# Patient Record
Sex: FEMALE | Race: WHITE | URBAN - METROPOLITAN AREA
[De-identification: names, ages, dates, MRNs, and addresses within clinical notes are randomized per-mention and may not be internally consistent; named-entity substitution may affect disease eponyms.]

---

## 2021-01-01 ENCOUNTER — EMERGENCY (EMERGENCY)
Facility: HOSPITAL | Age: 81
LOS: 1 days | End: 2021-01-01
Admitting: EMERGENCY MEDICINE
Payer: MEDICARE

## 2021-01-01 ENCOUNTER — INPATIENT (INPATIENT)
Facility: HOSPITAL | Age: 81
LOS: 3 days | DRG: 23 | End: 2021-04-02
Attending: PSYCHIATRY & NEUROLOGY | Admitting: PSYCHIATRY & NEUROLOGY
Payer: MEDICARE

## 2021-01-01 VITALS
HEART RATE: 50 BPM | DIASTOLIC BLOOD PRESSURE: 82 MMHG | SYSTOLIC BLOOD PRESSURE: 145 MMHG | OXYGEN SATURATION: 100 % | RESPIRATION RATE: 14 BRPM

## 2021-01-01 VITALS — OXYGEN SATURATION: 95 % | HEART RATE: 96 BPM | RESPIRATION RATE: 21 BRPM

## 2021-01-01 DIAGNOSIS — I63.9 CEREBRAL INFARCTION, UNSPECIFIED: ICD-10-CM

## 2021-01-01 LAB
A1C WITH ESTIMATED AVERAGE GLUCOSE RESULT: 5.3 % — SIGNIFICANT CHANGE UP (ref 4–5.6)
ABO RH CONFIRMATION: SIGNIFICANT CHANGE UP
ANION GAP SERPL CALC-SCNC: 10 MMOL/L — SIGNIFICANT CHANGE UP (ref 5–17)
ANION GAP SERPL CALC-SCNC: 11 MMOL/L — SIGNIFICANT CHANGE UP (ref 5–17)
ANION GAP SERPL CALC-SCNC: 12 MMOL/L — SIGNIFICANT CHANGE UP (ref 5–17)
ANISOCYTOSIS BLD QL: SLIGHT — SIGNIFICANT CHANGE UP
APTT BLD: 21.6 SEC — LOW (ref 27.5–35.5)
APTT BLD: 29.1 SEC — SIGNIFICANT CHANGE UP (ref 27.5–35.5)
BASOPHILS # BLD AUTO: 0.16 K/UL — SIGNIFICANT CHANGE UP (ref 0–0.2)
BASOPHILS NFR BLD AUTO: 0.9 % — SIGNIFICANT CHANGE UP (ref 0–2)
BLD GP AB SCN SERPL QL: SIGNIFICANT CHANGE UP
BUN SERPL-MCNC: 23 MG/DL — HIGH (ref 8–20)
BUN SERPL-MCNC: 25 MG/DL — HIGH (ref 8–20)
BUN SERPL-MCNC: 32 MG/DL — HIGH (ref 8–20)
CALCIUM SERPL-MCNC: 7.1 MG/DL — LOW (ref 8.6–10.2)
CALCIUM SERPL-MCNC: 7.3 MG/DL — LOW (ref 8.6–10.2)
CALCIUM SERPL-MCNC: 8 MG/DL — LOW (ref 8.6–10.2)
CHLORIDE SERPL-SCNC: 107 MMOL/L — SIGNIFICANT CHANGE UP (ref 98–107)
CHLORIDE SERPL-SCNC: 110 MMOL/L — HIGH (ref 98–107)
CHLORIDE SERPL-SCNC: 110 MMOL/L — HIGH (ref 98–107)
CHOLEST SERPL-MCNC: 123 MG/DL — SIGNIFICANT CHANGE UP
CO2 SERPL-SCNC: 21 MMOL/L — LOW (ref 22–29)
CO2 SERPL-SCNC: 22 MMOL/L — SIGNIFICANT CHANGE UP (ref 22–29)
CO2 SERPL-SCNC: 22 MMOL/L — SIGNIFICANT CHANGE UP (ref 22–29)
COVID-19 SPIKE DOMAIN AB INTERP: POSITIVE
COVID-19 SPIKE DOMAIN ANTIBODY RESULT: 8.9 U/ML — HIGH
CREAT SERPL-MCNC: 0.65 MG/DL — SIGNIFICANT CHANGE UP (ref 0.5–1.3)
CREAT SERPL-MCNC: 0.67 MG/DL — SIGNIFICANT CHANGE UP (ref 0.5–1.3)
CREAT SERPL-MCNC: 0.98 MG/DL — SIGNIFICANT CHANGE UP (ref 0.5–1.3)
DACRYOCYTES BLD QL SMEAR: SLIGHT — SIGNIFICANT CHANGE UP
EOSINOPHIL # BLD AUTO: 0.16 K/UL — SIGNIFICANT CHANGE UP (ref 0–0.5)
EOSINOPHIL NFR BLD AUTO: 0.9 % — SIGNIFICANT CHANGE UP (ref 0–6)
ESTIMATED AVERAGE GLUCOSE: 105 MG/DL — SIGNIFICANT CHANGE UP (ref 68–114)
FIBRINOGEN PPP-MCNC: 475 MG/DL — SIGNIFICANT CHANGE UP (ref 290–520)
GAS PNL BLDA: SIGNIFICANT CHANGE UP
GAS PNL BLDA: SIGNIFICANT CHANGE UP
GLUCOSE BLDC GLUCOMTR-MCNC: 120 MG/DL — HIGH (ref 70–99)
GLUCOSE BLDC GLUCOMTR-MCNC: 120 MG/DL — HIGH (ref 70–99)
GLUCOSE BLDC GLUCOMTR-MCNC: 137 MG/DL — HIGH (ref 70–99)
GLUCOSE BLDC GLUCOMTR-MCNC: 138 MG/DL — HIGH (ref 70–99)
GLUCOSE BLDC GLUCOMTR-MCNC: 147 MG/DL — HIGH (ref 70–99)
GLUCOSE BLDC GLUCOMTR-MCNC: 150 MG/DL — HIGH (ref 70–99)
GLUCOSE BLDC GLUCOMTR-MCNC: 166 MG/DL — HIGH (ref 70–99)
GLUCOSE BLDC GLUCOMTR-MCNC: 265 MG/DL — HIGH (ref 70–99)
GLUCOSE SERPL-MCNC: 150 MG/DL — HIGH (ref 70–99)
GLUCOSE SERPL-MCNC: 157 MG/DL — HIGH (ref 70–99)
GLUCOSE SERPL-MCNC: 160 MG/DL — HIGH (ref 70–99)
HCT VFR BLD CALC: 24.8 % — LOW (ref 34.5–45)
HCT VFR BLD CALC: 26.9 % — LOW (ref 34.5–45)
HCT VFR BLD CALC: 27 % — LOW (ref 34.5–45)
HCT VFR BLD CALC: 33.6 % — LOW (ref 34.5–45)
HCT VFR BLD CALC: 41 % — SIGNIFICANT CHANGE UP (ref 34.5–45)
HDLC SERPL-MCNC: 61 MG/DL — SIGNIFICANT CHANGE UP
HGB BLD-MCNC: 11 G/DL — LOW (ref 11.5–15.5)
HGB BLD-MCNC: 13.1 G/DL — SIGNIFICANT CHANGE UP (ref 11.5–15.5)
HGB BLD-MCNC: 8 G/DL — LOW (ref 11.5–15.5)
HGB BLD-MCNC: 8.8 G/DL — LOW (ref 11.5–15.5)
HGB BLD-MCNC: 8.9 G/DL — LOW (ref 11.5–15.5)
HYPOCHROMIA BLD QL: SLIGHT — SIGNIFICANT CHANGE UP
INR BLD: 1.1 RATIO — SIGNIFICANT CHANGE UP (ref 0.88–1.16)
INR BLD: 1.15 RATIO — SIGNIFICANT CHANGE UP (ref 0.88–1.16)
LIPID PNL WITH DIRECT LDL SERPL: 51 MG/DL — SIGNIFICANT CHANGE UP
LYMPHOCYTES # BLD AUTO: 0.63 K/UL — LOW (ref 1–3.3)
LYMPHOCYTES # BLD AUTO: 3.5 % — LOW (ref 13–44)
MAGNESIUM SERPL-MCNC: 1.7 MG/DL — SIGNIFICANT CHANGE UP (ref 1.6–2.6)
MAGNESIUM SERPL-MCNC: 2 MG/DL — SIGNIFICANT CHANGE UP (ref 1.6–2.6)
MAGNESIUM SERPL-MCNC: 2.1 MG/DL — SIGNIFICANT CHANGE UP (ref 1.6–2.6)
MAGNESIUM SERPL-MCNC: 2.6 MG/DL — SIGNIFICANT CHANGE UP (ref 1.8–2.6)
MANUAL SMEAR VERIFICATION: SIGNIFICANT CHANGE UP
MCHC RBC-ENTMCNC: 32 GM/DL — SIGNIFICANT CHANGE UP (ref 32–36)
MCHC RBC-ENTMCNC: 32.3 GM/DL — SIGNIFICANT CHANGE UP (ref 32–36)
MCHC RBC-ENTMCNC: 32.7 GM/DL — SIGNIFICANT CHANGE UP (ref 32–36)
MCHC RBC-ENTMCNC: 32.7 GM/DL — SIGNIFICANT CHANGE UP (ref 32–36)
MCHC RBC-ENTMCNC: 32.9 PG — SIGNIFICANT CHANGE UP (ref 27–34)
MCHC RBC-ENTMCNC: 33 GM/DL — SIGNIFICANT CHANGE UP (ref 32–36)
MCHC RBC-ENTMCNC: 33.2 PG — SIGNIFICANT CHANGE UP (ref 27–34)
MCHC RBC-ENTMCNC: 33.5 PG — SIGNIFICANT CHANGE UP (ref 27–34)
MCHC RBC-ENTMCNC: 33.7 PG — SIGNIFICANT CHANGE UP (ref 27–34)
MCHC RBC-ENTMCNC: 33.8 PG — SIGNIFICANT CHANGE UP (ref 27–34)
MCV RBC AUTO: 101.5 FL — HIGH (ref 80–100)
MCV RBC AUTO: 102.3 FL — HIGH (ref 80–100)
MCV RBC AUTO: 102.3 FL — HIGH (ref 80–100)
MCV RBC AUTO: 103 FL — HIGH (ref 80–100)
MCV RBC AUTO: 104.6 FL — HIGH (ref 80–100)
MONOCYTES # BLD AUTO: 0.77 K/UL — SIGNIFICANT CHANGE UP (ref 0–0.9)
MONOCYTES NFR BLD AUTO: 4.3 % — SIGNIFICANT CHANGE UP (ref 2–14)
MRSA PCR RESULT.: SIGNIFICANT CHANGE UP
NEUTROPHILS # BLD AUTO: 16.16 K/UL — HIGH (ref 1.8–7.4)
NEUTROPHILS NFR BLD AUTO: 87.8 % — HIGH (ref 43–77)
NEUTS BAND # BLD: 2.6 % — SIGNIFICANT CHANGE UP (ref 0–8)
NON HDL CHOLESTEROL: 62 MG/DL — SIGNIFICANT CHANGE UP
OVALOCYTES BLD QL SMEAR: SLIGHT — SIGNIFICANT CHANGE UP
PHOSPHATE SERPL-MCNC: 1.5 MG/DL — LOW (ref 2.4–4.7)
PHOSPHATE SERPL-MCNC: 3.3 MG/DL — SIGNIFICANT CHANGE UP (ref 2.4–4.7)
PHOSPHATE SERPL-MCNC: 3.8 MG/DL — SIGNIFICANT CHANGE UP (ref 2.4–4.7)
PHOSPHATE SERPL-MCNC: 4.4 MG/DL — SIGNIFICANT CHANGE UP (ref 2.4–4.7)
PLAT MORPH BLD: NORMAL — SIGNIFICANT CHANGE UP
PLATELET # BLD AUTO: 143 K/UL — LOW (ref 150–400)
PLATELET # BLD AUTO: 143 K/UL — LOW (ref 150–400)
PLATELET # BLD AUTO: 154 K/UL — SIGNIFICANT CHANGE UP (ref 150–400)
PLATELET # BLD AUTO: 174 K/UL — SIGNIFICANT CHANGE UP (ref 150–400)
PLATELET # BLD AUTO: 183 K/UL — SIGNIFICANT CHANGE UP (ref 150–400)
POIKILOCYTOSIS BLD QL AUTO: SLIGHT — SIGNIFICANT CHANGE UP
POLYCHROMASIA BLD QL SMEAR: SLIGHT — SIGNIFICANT CHANGE UP
POTASSIUM SERPL-MCNC: 3.7 MMOL/L — SIGNIFICANT CHANGE UP (ref 3.5–5.3)
POTASSIUM SERPL-MCNC: 3.7 MMOL/L — SIGNIFICANT CHANGE UP (ref 3.5–5.3)
POTASSIUM SERPL-MCNC: 4.3 MMOL/L — SIGNIFICANT CHANGE UP (ref 3.5–5.3)
POTASSIUM SERPL-SCNC: 3.7 MMOL/L — SIGNIFICANT CHANGE UP (ref 3.5–5.3)
POTASSIUM SERPL-SCNC: 3.7 MMOL/L — SIGNIFICANT CHANGE UP (ref 3.5–5.3)
POTASSIUM SERPL-SCNC: 4.3 MMOL/L — SIGNIFICANT CHANGE UP (ref 3.5–5.3)
PROCALCITONIN SERPL-MCNC: 0.73 NG/ML — HIGH (ref 0.02–0.1)
PROTHROM AB SERPL-ACNC: 12.7 SEC — SIGNIFICANT CHANGE UP (ref 10.6–13.6)
PROTHROM AB SERPL-ACNC: 13.2 SEC — SIGNIFICANT CHANGE UP (ref 10.6–13.6)
RAPID RVP RESULT: SIGNIFICANT CHANGE UP
RBC # BLD: 2.37 M/UL — LOW (ref 3.8–5.2)
RBC # BLD: 2.63 M/UL — LOW (ref 3.8–5.2)
RBC # BLD: 2.64 M/UL — LOW (ref 3.8–5.2)
RBC # BLD: 3.31 M/UL — LOW (ref 3.8–5.2)
RBC # BLD: 3.98 M/UL — SIGNIFICANT CHANGE UP (ref 3.8–5.2)
RBC # FLD: 14.1 % — SIGNIFICANT CHANGE UP (ref 10.3–14.5)
RBC # FLD: 14.1 % — SIGNIFICANT CHANGE UP (ref 10.3–14.5)
RBC # FLD: 14.7 % — HIGH (ref 10.3–14.5)
RBC # FLD: 14.8 % — HIGH (ref 10.3–14.5)
RBC # FLD: 15.1 % — HIGH (ref 10.3–14.5)
RBC BLD AUTO: ABNORMAL
S AUREUS DNA NOSE QL NAA+PROBE: SIGNIFICANT CHANGE UP
SARS-COV-2 IGG+IGM SERPL QL IA: 8.9 U/ML — HIGH
SARS-COV-2 IGG+IGM SERPL QL IA: POSITIVE
SARS-COV-2 RNA SPEC QL NAA+PROBE: SIGNIFICANT CHANGE UP
SCHISTOCYTES BLD QL AUTO: SLIGHT — SIGNIFICANT CHANGE UP
SODIUM SERPL-SCNC: 140 MMOL/L — SIGNIFICANT CHANGE UP (ref 135–145)
SODIUM SERPL-SCNC: 142 MMOL/L — SIGNIFICANT CHANGE UP (ref 135–145)
SODIUM SERPL-SCNC: 142 MMOL/L — SIGNIFICANT CHANGE UP (ref 135–145)
TRIGL SERPL-MCNC: 53 MG/DL — SIGNIFICANT CHANGE UP
TSH SERPL-MCNC: 1.97 UIU/ML — SIGNIFICANT CHANGE UP (ref 0.27–4.2)
WBC # BLD: 12.79 K/UL — HIGH (ref 3.8–10.5)
WBC # BLD: 14.81 K/UL — HIGH (ref 3.8–10.5)
WBC # BLD: 15.45 K/UL — HIGH (ref 3.8–10.5)
WBC # BLD: 16.47 K/UL — HIGH (ref 3.8–10.5)
WBC # BLD: 17.88 K/UL — HIGH (ref 3.8–10.5)
WBC # FLD AUTO: 12.79 K/UL — HIGH (ref 3.8–10.5)
WBC # FLD AUTO: 14.81 K/UL — HIGH (ref 3.8–10.5)
WBC # FLD AUTO: 15.45 K/UL — HIGH (ref 3.8–10.5)
WBC # FLD AUTO: 16.47 K/UL — HIGH (ref 3.8–10.5)
WBC # FLD AUTO: 17.88 K/UL — HIGH (ref 3.8–10.5)

## 2021-01-01 PROCEDURE — 70486 CT MAXILLOFACIAL W/O DYE: CPT | Mod: 26

## 2021-01-01 PROCEDURE — 70450 CT HEAD/BRAIN W/O DYE: CPT | Mod: 26,77

## 2021-01-01 PROCEDURE — 71045 X-RAY EXAM CHEST 1 VIEW: CPT

## 2021-01-01 PROCEDURE — 80061 LIPID PANEL: CPT

## 2021-01-01 PROCEDURE — 86850 RBC ANTIBODY SCREEN: CPT

## 2021-01-01 PROCEDURE — 71260 CT THORAX DX C+: CPT | Mod: 26

## 2021-01-01 PROCEDURE — C8929: CPT

## 2021-01-01 PROCEDURE — 86901 BLOOD TYPING SEROLOGIC RH(D): CPT

## 2021-01-01 PROCEDURE — 85610 PROTHROMBIN TIME: CPT

## 2021-01-01 PROCEDURE — 70450 CT HEAD/BRAIN W/O DYE: CPT

## 2021-01-01 PROCEDURE — 70551 MRI BRAIN STEM W/O DYE: CPT

## 2021-01-01 PROCEDURE — 70551 MRI BRAIN STEM W/O DYE: CPT | Mod: 26

## 2021-01-01 PROCEDURE — 99233 SBSQ HOSP IP/OBS HIGH 50: CPT

## 2021-01-01 PROCEDURE — 76380 CAT SCAN FOLLOW-UP STUDY: CPT

## 2021-01-01 PROCEDURE — 82435 ASSAY OF BLOOD CHLORIDE: CPT

## 2021-01-01 PROCEDURE — 0225U NFCT DS DNA&RNA 21 SARSCOV2: CPT

## 2021-01-01 PROCEDURE — 86900 BLOOD TYPING SEROLOGIC ABO: CPT

## 2021-01-01 PROCEDURE — 70498 CT ANGIOGRAPHY NECK: CPT | Mod: 26

## 2021-01-01 PROCEDURE — 85730 THROMBOPLASTIN TIME PARTIAL: CPT

## 2021-01-01 PROCEDURE — 70486 CT MAXILLOFACIAL W/O DYE: CPT

## 2021-01-01 PROCEDURE — C1760: CPT

## 2021-01-01 PROCEDURE — 93010 ELECTROCARDIOGRAM REPORT: CPT

## 2021-01-01 PROCEDURE — 61645 PERQ ART M-THROMBECT &/NFS: CPT

## 2021-01-01 PROCEDURE — 84443 ASSAY THYROID STIM HORMONE: CPT

## 2021-01-01 PROCEDURE — 70450 CT HEAD/BRAIN W/O DYE: CPT | Mod: 26

## 2021-01-01 PROCEDURE — 36620 INSERTION CATHETER ARTERY: CPT

## 2021-01-01 PROCEDURE — 71045 X-RAY EXAM CHEST 1 VIEW: CPT | Mod: 26

## 2021-01-01 PROCEDURE — 85018 HEMOGLOBIN: CPT

## 2021-01-01 PROCEDURE — 99291 CRITICAL CARE FIRST HOUR: CPT

## 2021-01-01 PROCEDURE — 85384 FIBRINOGEN ACTIVITY: CPT

## 2021-01-01 PROCEDURE — C1887: CPT

## 2021-01-01 PROCEDURE — 85027 COMPLETE CBC AUTOMATED: CPT

## 2021-01-01 PROCEDURE — 82784 ASSAY IGA/IGD/IGG/IGM EACH: CPT

## 2021-01-01 PROCEDURE — 87640 STAPH A DNA AMP PROBE: CPT

## 2021-01-01 PROCEDURE — 84100 ASSAY OF PHOSPHORUS: CPT

## 2021-01-01 PROCEDURE — 31500 INSERT EMERGENCY AIRWAY: CPT | Mod: GC

## 2021-01-01 PROCEDURE — 93970 EXTREMITY STUDY: CPT | Mod: 26

## 2021-01-01 PROCEDURE — 93970 EXTREMITY STUDY: CPT

## 2021-01-01 PROCEDURE — 84295 ASSAY OF SERUM SODIUM: CPT

## 2021-01-01 PROCEDURE — 99232 SBSQ HOSP IP/OBS MODERATE 35: CPT

## 2021-01-01 PROCEDURE — 80048 BASIC METABOLIC PNL TOTAL CA: CPT

## 2021-01-01 PROCEDURE — 84145 PROCALCITONIN (PCT): CPT

## 2021-01-01 PROCEDURE — 99223 1ST HOSP IP/OBS HIGH 75: CPT

## 2021-01-01 PROCEDURE — 83605 ASSAY OF LACTIC ACID: CPT

## 2021-01-01 PROCEDURE — 94003 VENT MGMT INPAT SUBQ DAY: CPT

## 2021-01-01 PROCEDURE — 82330 ASSAY OF CALCIUM: CPT

## 2021-01-01 PROCEDURE — 70496 CT ANGIOGRAPHY HEAD: CPT | Mod: 26

## 2021-01-01 PROCEDURE — 36415 COLL VENOUS BLD VENIPUNCTURE: CPT

## 2021-01-01 PROCEDURE — 86769 SARS-COV-2 COVID-19 ANTIBODY: CPT

## 2021-01-01 PROCEDURE — 74177 CT ABD & PELVIS W/CONTRAST: CPT | Mod: 26

## 2021-01-01 PROCEDURE — 94640 AIRWAY INHALATION TREATMENT: CPT

## 2021-01-01 PROCEDURE — 70450 CT HEAD/BRAIN W/O DYE: CPT | Mod: 26,59,MA,77

## 2021-01-01 PROCEDURE — 76377 3D RENDER W/INTRP POSTPROCES: CPT

## 2021-01-01 PROCEDURE — 94002 VENT MGMT INPAT INIT DAY: CPT

## 2021-01-01 PROCEDURE — 84132 ASSAY OF SERUM POTASSIUM: CPT

## 2021-01-01 PROCEDURE — 99291 CRITICAL CARE FIRST HOUR: CPT | Mod: 25

## 2021-01-01 PROCEDURE — 81376 HLA II TYPING 1 LOCUS LR: CPT

## 2021-01-01 PROCEDURE — 83735 ASSAY OF MAGNESIUM: CPT

## 2021-01-01 PROCEDURE — 70450 CT HEAD/BRAIN W/O DYE: CPT | Mod: 26,59

## 2021-01-01 PROCEDURE — 83036 HEMOGLOBIN GLYCOSYLATED A1C: CPT

## 2021-01-01 PROCEDURE — C1769: CPT

## 2021-01-01 PROCEDURE — 82962 GLUCOSE BLOOD TEST: CPT

## 2021-01-01 PROCEDURE — 99285 EMERGENCY DEPT VISIT HI MDM: CPT | Mod: CS

## 2021-01-01 PROCEDURE — 87641 MR-STAPH DNA AMP PROBE: CPT

## 2021-01-01 PROCEDURE — 82803 BLOOD GASES ANY COMBINATION: CPT

## 2021-01-01 PROCEDURE — 0042T: CPT

## 2021-01-01 PROCEDURE — 61645 PERQ ART M-THROMBECT &/NFS: CPT | Mod: RT

## 2021-01-01 PROCEDURE — 82947 ASSAY GLUCOSE BLOOD QUANT: CPT

## 2021-01-01 PROCEDURE — 99291 CRITICAL CARE FIRST HOUR: CPT | Mod: CS

## 2021-01-01 PROCEDURE — 85014 HEMATOCRIT: CPT

## 2021-01-01 RX ORDER — MIDAZOLAM HYDROCHLORIDE 1 MG/ML
1 INJECTION, SOLUTION INTRAMUSCULAR; INTRAVENOUS ONCE
Refills: 0 | Status: DISCONTINUED | OUTPATIENT
Start: 2021-01-01 | End: 2021-01-01

## 2021-01-01 RX ORDER — POTASSIUM PHOSPHATE, MONOBASIC POTASSIUM PHOSPHATE, DIBASIC 236; 224 MG/ML; MG/ML
30 INJECTION, SOLUTION INTRAVENOUS ONCE
Refills: 0 | Status: COMPLETED | OUTPATIENT
Start: 2021-01-01 | End: 2021-01-01

## 2021-01-01 RX ORDER — ENOXAPARIN SODIUM 100 MG/ML
40 INJECTION SUBCUTANEOUS DAILY
Refills: 0 | Status: DISCONTINUED | OUTPATIENT
Start: 2021-01-01 | End: 2021-01-01

## 2021-01-01 RX ORDER — ROBINUL 0.2 MG/ML
0.4 INJECTION INTRAMUSCULAR; INTRAVENOUS ONCE
Refills: 0 | Status: COMPLETED | OUTPATIENT
Start: 2021-01-01 | End: 2021-01-01

## 2021-01-01 RX ORDER — DEXTROSE 50 % IN WATER 50 %
25 SYRINGE (ML) INTRAVENOUS ONCE
Refills: 0 | Status: DISCONTINUED | OUTPATIENT
Start: 2021-01-01 | End: 2021-01-01

## 2021-01-01 RX ORDER — SOTALOL HCL 120 MG
80 TABLET ORAL ONCE
Refills: 0 | Status: COMPLETED | OUTPATIENT
Start: 2021-01-01 | End: 2021-01-01

## 2021-01-01 RX ORDER — ASPIRIN/CALCIUM CARB/MAGNESIUM 324 MG
81 TABLET ORAL DAILY
Refills: 0 | Status: DISCONTINUED | OUTPATIENT
Start: 2021-01-01 | End: 2021-01-01

## 2021-01-01 RX ORDER — FENTANYL CITRATE 50 UG/ML
12.5 INJECTION INTRAVENOUS EVERY 4 HOURS
Refills: 0 | Status: DISCONTINUED | OUTPATIENT
Start: 2021-01-01 | End: 2021-01-01

## 2021-01-01 RX ORDER — TEMAZEPAM 15 MG/1
1 CAPSULE ORAL
Qty: 0 | Refills: 0 | DISCHARGE

## 2021-01-01 RX ORDER — SODIUM CHLORIDE 9 MG/ML
1000 INJECTION INTRAMUSCULAR; INTRAVENOUS; SUBCUTANEOUS
Refills: 0 | Status: DISCONTINUED | OUTPATIENT
Start: 2021-01-01 | End: 2021-01-01

## 2021-01-01 RX ORDER — POTASSIUM CHLORIDE 20 MEQ
40 PACKET (EA) ORAL ONCE
Refills: 0 | Status: COMPLETED | OUTPATIENT
Start: 2021-01-01 | End: 2021-01-01

## 2021-01-01 RX ORDER — SENNA PLUS 8.6 MG/1
2 TABLET ORAL AT BEDTIME
Refills: 0 | Status: DISCONTINUED | OUTPATIENT
Start: 2021-01-01 | End: 2021-01-01

## 2021-01-01 RX ORDER — SOTALOL HCL 120 MG
1 TABLET ORAL
Qty: 0 | Refills: 0 | DISCHARGE

## 2021-01-01 RX ORDER — METOPROLOL TARTRATE 50 MG
5 TABLET ORAL EVERY 6 HOURS
Refills: 0 | Status: DISCONTINUED | OUTPATIENT
Start: 2021-01-01 | End: 2021-01-01

## 2021-01-01 RX ORDER — DIAZEPAM 5 MG
5 TABLET ORAL ONCE
Refills: 0 | Status: DISCONTINUED | OUTPATIENT
Start: 2021-01-01 | End: 2021-01-01

## 2021-01-01 RX ORDER — ETOMIDATE 2 MG/ML
10 INJECTION INTRAVENOUS ONCE
Refills: 0 | Status: COMPLETED | OUTPATIENT
Start: 2021-01-01 | End: 2021-01-01

## 2021-01-01 RX ORDER — MORPHINE SULFATE 50 MG/1
1 CAPSULE, EXTENDED RELEASE ORAL
Qty: 100 | Refills: 0 | Status: DISCONTINUED | OUTPATIENT
Start: 2021-01-01 | End: 2021-01-01

## 2021-01-01 RX ORDER — DEXMEDETOMIDINE HYDROCHLORIDE IN 0.9% SODIUM CHLORIDE 4 UG/ML
0.05 INJECTION INTRAVENOUS
Qty: 200 | Refills: 0 | Status: DISCONTINUED | OUTPATIENT
Start: 2021-01-01 | End: 2021-01-01

## 2021-01-01 RX ORDER — MORPHINE SULFATE 50 MG/1
2 CAPSULE, EXTENDED RELEASE ORAL
Qty: 100 | Refills: 0 | Status: DISCONTINUED | OUTPATIENT
Start: 2021-01-01 | End: 2021-01-01

## 2021-01-01 RX ORDER — SODIUM CHLORIDE 9 MG/ML
1000 INJECTION, SOLUTION INTRAVENOUS
Refills: 0 | Status: DISCONTINUED | OUTPATIENT
Start: 2021-01-01 | End: 2021-01-01

## 2021-01-01 RX ORDER — CALCIUM GLUCONATE 100 MG/ML
1 VIAL (ML) INTRAVENOUS ONCE
Refills: 0 | Status: COMPLETED | OUTPATIENT
Start: 2021-01-01 | End: 2021-01-01

## 2021-01-01 RX ORDER — ROBINUL 0.2 MG/ML
0.4 INJECTION INTRAMUSCULAR; INTRAVENOUS EVERY 6 HOURS
Refills: 0 | Status: DISCONTINUED | OUTPATIENT
Start: 2021-01-01 | End: 2021-01-01

## 2021-01-01 RX ORDER — FUROSEMIDE 40 MG
40 TABLET ORAL ONCE
Refills: 0 | Status: COMPLETED | OUTPATIENT
Start: 2021-01-01 | End: 2021-01-01

## 2021-01-01 RX ORDER — SODIUM CHLORIDE 9 MG/ML
1000 INJECTION INTRAMUSCULAR; INTRAVENOUS; SUBCUTANEOUS ONCE
Refills: 0 | Status: COMPLETED | OUTPATIENT
Start: 2021-01-01 | End: 2021-01-01

## 2021-01-01 RX ORDER — ASCORBIC ACID 60 MG
500 TABLET,CHEWABLE ORAL DAILY
Refills: 0 | Status: DISCONTINUED | OUTPATIENT
Start: 2021-01-01 | End: 2021-01-01

## 2021-01-01 RX ORDER — ATORVASTATIN CALCIUM 80 MG/1
80 TABLET, FILM COATED ORAL AT BEDTIME
Refills: 0 | Status: DISCONTINUED | OUTPATIENT
Start: 2021-01-01 | End: 2021-01-01

## 2021-01-01 RX ORDER — MIDAZOLAM HYDROCHLORIDE 1 MG/ML
0.5 INJECTION, SOLUTION INTRAMUSCULAR; INTRAVENOUS ONCE
Refills: 0 | Status: DISCONTINUED | OUTPATIENT
Start: 2021-01-01 | End: 2021-01-01

## 2021-01-01 RX ORDER — DEXTROSE 50 % IN WATER 50 %
15 SYRINGE (ML) INTRAVENOUS ONCE
Refills: 0 | Status: DISCONTINUED | OUTPATIENT
Start: 2021-01-01 | End: 2021-01-01

## 2021-01-01 RX ORDER — PANTOPRAZOLE SODIUM 20 MG/1
40 TABLET, DELAYED RELEASE ORAL DAILY
Refills: 0 | Status: DISCONTINUED | OUTPATIENT
Start: 2021-01-01 | End: 2021-01-01

## 2021-01-01 RX ORDER — POLYETHYLENE GLYCOL 3350 17 G/17G
17 POWDER, FOR SOLUTION ORAL DAILY
Refills: 0 | Status: DISCONTINUED | OUTPATIENT
Start: 2021-01-01 | End: 2021-01-01

## 2021-01-01 RX ORDER — INSULIN LISPRO 100/ML
VIAL (ML) SUBCUTANEOUS EVERY 6 HOURS
Refills: 0 | Status: DISCONTINUED | OUTPATIENT
Start: 2021-01-01 | End: 2021-01-01

## 2021-01-01 RX ORDER — ATORVASTATIN CALCIUM 80 MG/1
40 TABLET, FILM COATED ORAL AT BEDTIME
Refills: 0 | Status: DISCONTINUED | OUTPATIENT
Start: 2021-01-01 | End: 2021-01-01

## 2021-01-01 RX ORDER — ROCURONIUM BROMIDE 10 MG/ML
25 VIAL (ML) INTRAVENOUS ONCE
Refills: 0 | Status: COMPLETED | OUTPATIENT
Start: 2021-01-01 | End: 2021-01-01

## 2021-01-01 RX ORDER — DEXTROSE 50 % IN WATER 50 %
12.5 SYRINGE (ML) INTRAVENOUS ONCE
Refills: 0 | Status: DISCONTINUED | OUTPATIENT
Start: 2021-01-01 | End: 2021-01-01

## 2021-01-01 RX ORDER — MAGNESIUM SULFATE 500 MG/ML
2 VIAL (ML) INJECTION ONCE
Refills: 0 | Status: COMPLETED | OUTPATIENT
Start: 2021-01-01 | End: 2021-01-01

## 2021-01-01 RX ORDER — IPRATROPIUM/ALBUTEROL SULFATE 18-103MCG
3 AEROSOL WITH ADAPTER (GRAM) INHALATION ONCE
Refills: 0 | Status: COMPLETED | OUTPATIENT
Start: 2021-01-01 | End: 2021-01-01

## 2021-01-01 RX ORDER — GLUCAGON INJECTION, SOLUTION 0.5 MG/.1ML
1 INJECTION, SOLUTION SUBCUTANEOUS ONCE
Refills: 0 | Status: DISCONTINUED | OUTPATIENT
Start: 2021-01-01 | End: 2021-01-01

## 2021-01-01 RX ORDER — MORPHINE SULFATE 50 MG/1
2 CAPSULE, EXTENDED RELEASE ORAL
Refills: 0 | Status: DISCONTINUED | OUTPATIENT
Start: 2021-01-01 | End: 2021-01-01

## 2021-01-01 RX ORDER — CHLORHEXIDINE GLUCONATE 213 G/1000ML
15 SOLUTION TOPICAL EVERY 12 HOURS
Refills: 0 | Status: DISCONTINUED | OUTPATIENT
Start: 2021-01-01 | End: 2021-01-01

## 2021-01-01 RX ORDER — PREGABALIN 225 MG/1
1 CAPSULE ORAL
Qty: 0 | Refills: 0 | DISCHARGE

## 2021-01-01 RX ORDER — SOTALOL HCL 120 MG
80 TABLET ORAL EVERY 12 HOURS
Refills: 0 | Status: DISCONTINUED | OUTPATIENT
Start: 2021-01-01 | End: 2021-01-01

## 2021-01-01 RX ORDER — ACETAMINOPHEN 500 MG
650 TABLET ORAL EVERY 6 HOURS
Refills: 0 | Status: DISCONTINUED | OUTPATIENT
Start: 2021-01-01 | End: 2021-01-01

## 2021-01-01 RX ORDER — MULTIVIT-MIN/FERROUS GLUCONATE 9 MG/15 ML
15 LIQUID (ML) ORAL DAILY
Refills: 0 | Status: DISCONTINUED | OUTPATIENT
Start: 2021-01-01 | End: 2021-01-01

## 2021-01-01 RX ORDER — ASPIRIN/CALCIUM CARB/MAGNESIUM 324 MG
1 TABLET ORAL
Qty: 0 | Refills: 0 | DISCHARGE

## 2021-01-01 RX ADMIN — CHLORHEXIDINE GLUCONATE 15 MILLILITER(S): 213 SOLUTION TOPICAL at 06:10

## 2021-01-01 RX ADMIN — Medication 40 MILLIGRAM(S): at 12:20

## 2021-01-01 RX ADMIN — Medication 81 MILLIGRAM(S): at 13:34

## 2021-01-01 RX ADMIN — ATORVASTATIN CALCIUM 40 MILLIGRAM(S): 80 TABLET, FILM COATED ORAL at 21:01

## 2021-01-01 RX ADMIN — SODIUM CHLORIDE 500 MILLILITER(S): 9 INJECTION INTRAMUSCULAR; INTRAVENOUS; SUBCUTANEOUS at 03:15

## 2021-01-01 RX ADMIN — Medication 3 MILLILITER(S): at 12:55

## 2021-01-01 RX ADMIN — Medication 40 MILLIEQUIVALENT(S): at 06:38

## 2021-01-01 RX ADMIN — PANTOPRAZOLE SODIUM 40 MILLIGRAM(S): 20 TABLET, DELAYED RELEASE ORAL at 13:34

## 2021-01-01 RX ADMIN — PANTOPRAZOLE SODIUM 40 MILLIGRAM(S): 20 TABLET, DELAYED RELEASE ORAL at 11:14

## 2021-01-01 RX ADMIN — FENTANYL CITRATE 12.5 MICROGRAM(S): 50 INJECTION INTRAVENOUS at 10:15

## 2021-01-01 RX ADMIN — Medication 650 MILLIGRAM(S): at 22:30

## 2021-01-01 RX ADMIN — FENTANYL CITRATE 12.5 MICROGRAM(S): 50 INJECTION INTRAVENOUS at 01:44

## 2021-01-01 RX ADMIN — ENOXAPARIN SODIUM 40 MILLIGRAM(S): 100 INJECTION SUBCUTANEOUS at 11:44

## 2021-01-01 RX ADMIN — Medication 650 MILLIGRAM(S): at 00:48

## 2021-01-01 RX ADMIN — Medication 15 MILLILITER(S): at 13:34

## 2021-01-01 RX ADMIN — Medication 81 MILLIGRAM(S): at 11:44

## 2021-01-01 RX ADMIN — CHLORHEXIDINE GLUCONATE 15 MILLILITER(S): 213 SOLUTION TOPICAL at 05:22

## 2021-01-01 RX ADMIN — SENNA PLUS 2 TABLET(S): 8.6 TABLET ORAL at 21:38

## 2021-01-01 RX ADMIN — Medication 25 MILLIGRAM(S): at 13:00

## 2021-01-01 RX ADMIN — Medication 2: at 05:47

## 2021-01-01 RX ADMIN — SENNA PLUS 2 TABLET(S): 8.6 TABLET ORAL at 21:01

## 2021-01-01 RX ADMIN — Medication 500 MILLIGRAM(S): at 11:44

## 2021-01-01 RX ADMIN — Medication 1 MILLIGRAM(S): at 09:09

## 2021-01-01 RX ADMIN — ETOMIDATE 10 MILLIGRAM(S): 2 INJECTION INTRAVENOUS at 13:00

## 2021-01-01 RX ADMIN — Medication 2 MILLIGRAM(S): at 15:50

## 2021-01-01 RX ADMIN — Medication 2: at 05:43

## 2021-01-01 RX ADMIN — CHLORHEXIDINE GLUCONATE 15 MILLILITER(S): 213 SOLUTION TOPICAL at 18:01

## 2021-01-01 RX ADMIN — Medication 40 MILLIEQUIVALENT(S): at 09:27

## 2021-01-01 RX ADMIN — Medication 650 MILLIGRAM(S): at 21:38

## 2021-01-01 RX ADMIN — Medication 500 MILLIGRAM(S): at 13:34

## 2021-01-01 RX ADMIN — Medication 40 MILLIGRAM(S): at 12:15

## 2021-01-01 RX ADMIN — Medication 650 MILLIGRAM(S): at 21:01

## 2021-01-01 RX ADMIN — MIDAZOLAM HYDROCHLORIDE 0.5 MILLIGRAM(S): 1 INJECTION, SOLUTION INTRAMUSCULAR; INTRAVENOUS at 12:43

## 2021-01-01 RX ADMIN — Medication 6: at 00:46

## 2021-01-01 RX ADMIN — POLYETHYLENE GLYCOL 3350 17 GRAM(S): 17 POWDER, FOR SOLUTION ORAL at 11:44

## 2021-01-01 RX ADMIN — MORPHINE SULFATE 1 MG/HR: 50 CAPSULE, EXTENDED RELEASE ORAL at 15:47

## 2021-01-01 RX ADMIN — PANTOPRAZOLE SODIUM 40 MILLIGRAM(S): 20 TABLET, DELAYED RELEASE ORAL at 11:44

## 2021-01-01 RX ADMIN — DEXMEDETOMIDINE HYDROCHLORIDE IN 0.9% SODIUM CHLORIDE 0.76 MICROGRAM(S)/KG/HR: 4 INJECTION INTRAVENOUS at 13:26

## 2021-01-01 RX ADMIN — ROBINUL 0.4 MILLIGRAM(S): 0.2 INJECTION INTRAMUSCULAR; INTRAVENOUS at 15:47

## 2021-01-01 RX ADMIN — Medication 2: at 17:40

## 2021-01-01 RX ADMIN — Medication 80 MILLIGRAM(S): at 09:07

## 2021-01-01 RX ADMIN — CHLORHEXIDINE GLUCONATE 15 MILLILITER(S): 213 SOLUTION TOPICAL at 17:40

## 2021-01-01 RX ADMIN — CHLORHEXIDINE GLUCONATE 15 MILLILITER(S): 213 SOLUTION TOPICAL at 05:46

## 2021-01-01 RX ADMIN — Medication 50 GRAM(S): at 06:59

## 2021-01-01 RX ADMIN — Medication 15 MILLILITER(S): at 11:44

## 2021-01-01 RX ADMIN — ENOXAPARIN SODIUM 40 MILLIGRAM(S): 100 INJECTION SUBCUTANEOUS at 13:34

## 2021-01-01 RX ADMIN — Medication 5 MILLIGRAM(S): at 05:00

## 2021-01-01 RX ADMIN — POLYETHYLENE GLYCOL 3350 17 GRAM(S): 17 POWDER, FOR SOLUTION ORAL at 13:34

## 2021-01-01 RX ADMIN — SODIUM CHLORIDE 75 MILLILITER(S): 9 INJECTION INTRAMUSCULAR; INTRAVENOUS; SUBCUTANEOUS at 18:01

## 2021-01-01 RX ADMIN — FENTANYL CITRATE 12.5 MICROGRAM(S): 50 INJECTION INTRAVENOUS at 09:39

## 2021-01-01 RX ADMIN — POTASSIUM PHOSPHATE, MONOBASIC POTASSIUM PHOSPHATE, DIBASIC 83.33 MILLIMOLE(S): 236; 224 INJECTION, SOLUTION INTRAVENOUS at 06:11

## 2021-01-01 RX ADMIN — FENTANYL CITRATE 12.5 MICROGRAM(S): 50 INJECTION INTRAVENOUS at 02:14

## 2021-01-01 RX ADMIN — Medication 100 GRAM(S): at 09:27

## 2021-01-01 RX ADMIN — Medication 80 MILLIGRAM(S): at 21:01

## 2021-03-29 NOTE — ED ADULT NURSE NOTE - OBJECTIVE STATEMENT
Code stroke activated and code biplane activated PTA.  Pt received on ventilator, airway secure.  Pt to CT and brought to Neuro intervention suite with RN and respiratory at bedside.

## 2021-03-29 NOTE — ED PROVIDER NOTE - CLINICAL SUMMARY MEDICAL DECISION MAKING FREE TEXT BOX
Pt transferred from [facility] for neurointerventional procedure s/p fall at home and became unresponsive with initial NIHSS 21 with acute R IC occlusion Pt transferred from [facility] via [facility] for neurointerventional procedure s/p fall at home and became unresponsive with initial NIHSS 21 with acute R IC/MCA occlusion.  Pt accepted by Dr. Verde

## 2021-03-29 NOTE — ED ADULT NURSE NOTE - CHIEF COMPLAINT QUOTE
Transfer from [facility] for neuro intervention. Pt presented to ED s/p fall and was found to have a right MCA ischemic stroke. Pt intubated prior to arrival for airway protection 7.5 ETT 21@ lip. Propofol gtt infusing @5mcg. TPA initiated at 2219. Code Stroke/Biplane activated. Pt brought to ct scan. MD and critical care team at bedside.

## 2021-03-29 NOTE — ED ADULT TRIAGE NOTE - CHIEF COMPLAINT QUOTE
Transfer from [facility] for neuro intervention. Pt presented to ED s/p fall and was found to have a right MCA ischemic stroke. Pt intubated prior to arrival for airway protection 7.5 ETT 21@ lip. Propofol gtt infusing @5mcg. TPA initiated at 2219. Code Stroke/Biplane activated. Pt brought to ct scan. MD and critical care team at bedside.

## 2021-03-29 NOTE — ED PROVIDER NOTE - OBJECTIVE STATEMENT
83 y/o F pt with no known significant PMHx presents to the ED as a transfer from   No further complaints at this time. 83 y/o F pt with no known significant PMHx presents to the ED as a transfer from [place] via [facility] for a neuro intervention procedure. She was found unresponsive by her .

## 2021-03-30 NOTE — DIETITIAN INITIAL EVALUATION ADULT. - ALTERNATE MEANS OF NUTRITION
Jevity @ 50ml/hr (x20 hours) 1000ml/day; 1500kcal, 64gm protein (if unable to extubate)/initiate tube feeding

## 2021-03-30 NOTE — PROGRESS NOTE ADULT - SUBJECTIVE AND OBJECTIVE BOX
SUMMARY:HPI:  80F who presents as CODE BIPLANE from [facility] [place].  The patient was last seen well per EMS report by  at 8pm when she sustained a fall and was found to have a left facial droop and was unresponsive.  The patient brought to [facility] where code stroke was activated (NIH on presentation 21 a CTA revealed right ICA and MCA (m2 per verbal report) occlusion and received IV TPA prior to activation of [facility] for transfer.  TPA bolus at 2219 per EMS.  TPA infusion started at 2224, and completed prior to arrival of patient to [facility].  Upon arrival taken to CT per Dr. Verde request and then taken to angio suite as soon as possible.  There were multiple attempts to call the  Efe Ortega [phone], but he did not answer the phone; he was driving from [facility] to [facility] as the patient was being transported by helicopter; therefore the procedure was performed emergently.    mRS baseline unknown at this time.  NIH at [facility] 21, NIH performed at [facility] 14 (see below) (30 Mar 2021 00:39)    OVERNIGHT EVENTS:       Allergies    azithromycin (Unknown)  erythromycin (Unknown)    Intolerances        REVIEW OF SYSTEMS: [ ] Unable to Assess due to neurologic exam   [ ] All ROS addressed below are non-contributory, except:  Neuro: [ ] Headache [ ] Back pain [ ] Numbness [ ] Weakness [ ] Ataxia [ ] Dizziness [ ] Aphasia [ ] Dysarthria [ ] Visual disturbance  Resp: [ ] Shortness of breath/dyspnea, [ ] Orthopnea [ ] Cough  CV: [ ] Chest pain [ ] Palpitation [ ] Lightheadedness [ ] Syncope  Renal: [ ] Thirst [ ] Edema  GI: [ ] Nausea [ ] Emesis [ ] Abdominal pain [ ] Constipation [ ] Diarrhea  Hem: [ ] Hematemesis [ ] bright red blood per rectum  ID: [ ] Fever [ ] Chills [ ] Dysuria  ENT: [ ] Rhinorrhea    DEVICES:    [X ] arterial line [ X] guevara     VITALS: [ ] Reviewed  Vital Signs Last 24 Hrs  T(C): 37.1 (30 Mar 2021 07:55), Max: 37.1 (30 Mar 2021 07:55)  T(F): 98.8 (30 Mar 2021 07:55), Max: 98.8 (30 Mar 2021 07:55)  HR: 71 (30 Mar 2021 11:25) (50 - 71)  BP: 136/83 (30 Mar 2021 11:25) (111/82 - 145/82)  BP(mean): 95 (30 Mar 2021 11:25) (90 - 110)  RR: 23 (30 Mar 2021 11:25) (10 - 23)  SpO2: 100% (30 Mar 2021 11:25) (99% - 100%)  CAPILLARY BLOOD GLUCOSE      POCT Blood Glucose.: 138 mg/dL (30 Mar 2021 11:12)    Mode: AC/ CMV (Assist Control/ Continuous Mandatory Ventilation)  RR (machine): 14  TV (machine): 450  FiO2: 40  PEEP: 5  MAP: 8  PIP: 18      LABS:  PT/INR - ( 29 Mar 2021 23:59 )   PT: 12.7 sec;   INR: 1.10 ratio         PTT - ( 29 Mar 2021 23:59 )  PTT:29.1 sec  03-30    140  |  107  |  23.0<H>  ----------------------------<  160<H>  3.7   |  22.0  |  0.65    Ca    7.1<L>      30 Mar 2021 04:47  Phos  3.8     03-30  Mg     1.7     03-30                            11.0   16.47 )-----------( 174      ( 30 Mar 2021 04:47 )             33.6       STROKE CORE MEASURES:     HGBA1C -= 5.3  LDL- 51  TSH - 1.9       IMAGING/DATA: [ X] Reviewed    IVF FLUIDS/MEDICATIONS: [ ] Reviewed  MEDICATIONS  (STANDING):  atorvastatin 80 milliGRAM(s) Oral at bedtime  chlorhexidine 0.12% Liquid 15 milliLiter(s) Oral Mucosa every 12 hours  dextrose 40% Gel 15 Gram(s) Oral once  dextrose 5%. 1000 milliLiter(s) (50 mL/Hr) IV Continuous <Continuous>  dextrose 5%. 1000 milliLiter(s) (100 mL/Hr) IV Continuous <Continuous>  dextrose 50% Injectable 25 Gram(s) IV Push once  dextrose 50% Injectable 12.5 Gram(s) IV Push once  dextrose 50% Injectable 25 Gram(s) IV Push once  glucagon  Injectable 1 milliGRAM(s) IntraMuscular once  insulin lispro (ADMELOG) corrective regimen sliding scale   SubCutaneous every 6 hours  pantoprazole  Injectable 40 milliGRAM(s) IV Push daily  sodium chloride 0.9%. 1000 milliLiter(s) (75 mL/Hr) IV Continuous <Continuous>    MEDICATIONS  (PRN):    I&O's Summary    29 Mar 2021 07:01  -  30 Mar 2021 07:00  --------------------------------------------------------  IN: 1300 mL / OUT: 770 mL / NET: 530 mL    30 Mar 2021 07:01  -  30 Mar 2021 11:41  --------------------------------------------------------  IN: 555 mL / OUT: 180 mL / NET: 375 mL    CT Head No Cont (03.30.21 @ 06:25) >    IMPRESSION: Increased hyperdensity in the right basal ganglia and right frontal operculum as compared to the prior study. Resolved hypodensity in the paramedian right frontal lobe. Findings can suggest intracranial hemorrhage versus contrast staining. Follow-up head CT or MRI is recommended for further evaluation no herniation pattern.              EXAMINATION:  PHYSICAL EXAM:    Constitutional: No Acute Distress     Neurological:Intubated , R gaze prefernce ;  3mm and reactive , follow commands    Motor exam:          Upper extremity                         Delt     Bicep     Tricep    HG                                                 R         5/5        5/5        5/5       5/5                                               L          localizes          Lower extremity                        HF         KF        KE       DF         PF                                                  R        5/5        5/5        5/5       5/5         5/5                                               L         1-2/5                                                 Sensation: [X ] intact to light touch       Pulmonary: Clear to Auscultation, No rales, No rhonchi, No wheezes     Cardiovascular: S1, S2, Regular rate and rhythm     Gastrointestinal: Soft, Non-tender, Non-distended     Extremities: No calf tenderness

## 2021-03-30 NOTE — PROGRESS NOTE ADULT - ASSESSMENT
ASSESSMENT/PLAN: S/P R M1 occlusion ; S/P alteplase given 2219 ; TICI- 3 thrombectomy     NEURO:  Neuro q 1 hr  CT at 10 pm   Stroke core measures  MRI of brain  No AED needed  Activity:  [X] PT [X] OT / Splinting L leg     PULM: Intubated for airway protection   Weaning parameters   Cuff leak   Wean to extubate     CV:  hx of afib---> sinus   SBP goal- 110-140  QTC - 515 if QTc improves restrat sotolal 80mg q 12  Change to metoprolol for now   ECHO     RENAL: Cr stable   Na goal 135- 145   Fluids: NS at 75 cc/hr     GI: NPO for possible extubation   Diet:  GI prophylaxis [X] not indicated   Bowel regimen  [X] senna [    ENDO:   Monitor FS 48 hrs- low SSI q 6   Goal euglycemia (-180)    HEME/ONC:  VTE prophylaxis: [X] SCDs [] chemoprophylaxis- hold till F/U Ct    ID:  Afeb    SOCIAL/FAMILY: [x] family meeting    CODE STATUS:  [x] Full Code     DISPOSITION:  [X] ICU     []x Patient is at high risk of neurologic deterioration/death due to: brain swelling and edema    Time spent:  40critical care minutes

## 2021-03-30 NOTE — DIETITIAN INITIAL EVALUATION ADULT. - OTHER INFO
Pt is a 80F who presents as CODE BIPLANE from [facility] [place].  The patient was last seen well per EMS report by  at 8pm when she sustained a fall and was found to have a left facial droop and was unresponsive.  The patient brought to [facility] [place] where code stroke was activated (NIH on presentation 21 a CTA revealed right ICA and MCA (m2 per verbal report) occlusion and received IV TPA prior to activation of [facility] for transfer.  TPA bolus at 2219 per EMS.  TPA infusion started at 2224, and completed prior to arrival of patient to [facility].  Upon arrival taken to [facility] per Dr. Verde request and then taken to angio suite as soon as possible.  There were multiple attempts to call the  Efe Ortega [phone], but he did not answer the phone; he was driving from [facility] to [facility] as the patient was being transported by helicopter; therefore the procedure was performed emergently.  Pt is now S/P R M1 occlusion ; S/P alteplase given 2219 ; TICI- 3 thrombectomy, remains NPO on vent support.

## 2021-03-30 NOTE — H&P ADULT - ASSESSMENT
80F hx of brain hemorrhage approximately 3 years ago.  Presents as CODE BIPLANE from [facility] [place].  The patient was last seen well per EMS report by  at 8pm when she sustained a fall and was found to have a left facial droop and was unresponsive.  The patient brought to [facility] [place] where code stroke was activated (NIH on presentation 21 a CTA revealed right ICA and MCA     HPI:      Neuro:  	Post TPA Neuro Checks and Vital Sign Checks as ordered.               Then Q1 hour Neuro checks, Q1 hour Vitals   	After HOB Flat for 4 hours, HOB 30 degrees, Neck midline position  	Neurosurgical Imaging Reviewed, Repeat imaging to be discussed with               Dr. Verde post intervention.  MRI ordered to evaluate stroke burden.                May need Nsx consultation for hemicraniectomy watch if concern for large               hemispheric infarction.  	Stroke Work Up: TTE, A1c, Lipid Panel, TSH.  May require additional work up pending further history to be obtained from family. May need JENNIFER or ILR pending clinical course.  	  CV:  	SBP Goal to be decided by Dr. Verde post intervention  	BP regimen: Nicardipine PRN  	Access: Peripheral IV Access    Pulm:  	14/450/60%/+5.    	Supplemental O2 PRN to maintain Spo2>94%  	Chest PT, OOB, Pulmonary Toilet    GI:  	Nutrition: NPO  	GI prophylaxis Protonix  	Bowel regimen  	  Gu:  	Voiding  	I&O Q1 hour  	Monitor Electrolytes & Renal Function    Heme:  	Monitor H&H  	Antiplatelet / Anticoagulation: Contraindicated post TPA, though possibly required depending on the results of the neurointerventional procedure  On hold for now.  	Chemical DVT prophylaxis:   		* Chemical DVT prophylaxis is contraindicated due to risk of bleeding  	Mechanical DVT Prophylaxis: Maintain B/L LE sequential compression devices  	  ID:  	Monitor WBC and Temperature  			    Endo  	Monitor BGL, maintain <180  	VICKI   		100-150   no correction  		150-200   2units  		200-250   4units  		250-300	   6units  		300-350   8units  		350-400   10units  		400+	   12units     Dispo:  PT/OT ordered.        I have spent [-70-] minutes directly managing this patient.    I attest that >50% of this total time was spent counseling, and or coordinating care via review of relevant history, performing my own independent physical examination, personal review of images discussion with the multidisciplinary team and any consultants involved in this patient's care.  This management was performed directly at the patient's bedside or within the Neurocritical Care Unit, ER and neurointerventional suite, but is independent of any and all procedures performed.. 80F hx of brain hemorrhage approximately 3 years ago.  Presents as CODE BIPLANE from [facility] [place].  The patient was last seen well per EMS report by  at 8pm when she sustained a fall and was found to have a left facial droop and was unresponsive.  The patient brought to [facility] [place] where code stroke was activated (NIH on presentation 21 a CTA revealed right ICA and MCA     HPI:      Neuro:  	Post TPA Neuro Checks and Vital Sign Checks as ordered.               Then Q1 hour Neuro checks, Q1 hour Vitals   	After HOB Flat for 4 hours, HOB 30 degrees, Neck midline position  	Neurosurgical Imaging Reviewed, Repeat imaging to be discussed with               Dr. Verde post intervention.  MRI ordered to evaluate stroke burden.  CTH in [place]              May need Nsx consultation for hemicraniectomy watch if concern for large               hemispheric infarction. - Hold for now, as discussed with Dr. Verde.  	Stroke Work Up: TTE, A1c, Lipid Panel, TSH.  May require additional work up pending further history to be obtained from family. May need JENNIFER or ILR pending clinical course.  	  CV:  	SBP Goal to be decided by Dr. Verde post intervention. 100-140  	BP regimen: Nicardipine PRN  	Access: Peripheral IV Access    Pulm:  	14/450/60%/+5.    	Supplemental O2 PRN to maintain Spo2>94%  	Chest PT, OOB, Pulmonary Toilet    GI:  	Nutrition: NPO, OGT placed, f/u xray  	GI prophylaxis Protonix  	Bowel regimen  	  Gu:  	Voiding  	I&O Q1 hour  	Monitor Electrolytes & Renal Function    Heme:  	Monitor H&H  	Antiplatelet / Anticoagulation: Contraindicated post TPA, though possibly required depending on the results of the neurointerventional procedure  On hold for now.  	Chemical DVT prophylaxis:   		* Chemical DVT prophylaxis is contraindicated due to risk of bleeding  	Mechanical DVT Prophylaxis: Maintain B/L LE sequential compression devices  	  ID:  	Monitor WBC and Temperature  			    Endo  	Monitor BGL, maintain <180  	VICKI   		100-150   no correction  		150-200   2units  		200-250   4units  		250-300	   6units  		300-350   8units  		350-400   10units  		400+	   12units     Dispo:  PT/OT ordered.        I have spent [-70-] minutes directly managing this patient.    I attest that >50% of this total time was spent counseling, and or coordinating care via review of relevant history, performing my own independent physical examination, personal review of images discussion with the multidisciplinary team and any consultants involved in this patient's care.  This management was performed directly at the patient's bedside or within the Neurocritical Care Unit, ER and neurointerventional suite, but is independent of any and all procedures performed..

## 2021-03-30 NOTE — H&P ADULT - NSHPLABSRESULTS_GEN_ALL_CORE
Vitals:  Vital Signs Last 24 Hrs  T(C): --  T(F): --  HR: 50 (29 Mar 2021 23:12) (50 - 50)  BP: 145/82 (29 Mar 2021 23:12) (145/82 - 145/82)  BP(mean): --  RR: 14 (29 Mar 2021 23:12) (14 - 14)  SpO2: 100% (29 Mar 2021 23:12) (100% - 100%)    I&O:  I&O's Summary      Labs:                         13.1   12.79 )-----------( 183      ( 29 Mar 2021 23:59 )             41.0         Phos  4.4     03-29  Mg     2.0     03-29            PT/INR - ( 29 Mar 2021 23:59 )   PT: 12.7 sec;   INR: 1.10 ratio         PTT - ( 29 Mar 2021 23:59 )  PTT:29.1 sec

## 2021-03-30 NOTE — H&P ADULT - HISTORY OF PRESENT ILLNESS
80F who presents as CODE BIPLANE from [facility].  The patient was last seen well per EMS report by  at 8pm when she sustained a fall and was found to have a left facial droop and was unresponsive.  The patient brought to [facility] where code stroke was activated (NIH on presentation 21 a CTA revealed right ICA and MCA (m2 per verbal report) occlusion and received IV TPA prior to activation of [facility] for transfer.  TPA bolus at 2219 per EMS.  TPA infusion started at 2224, and completed prior to arrival of patient to [facility].  Upon arrival taken to CT per Dr. Verde request and then taken to angio suite as soon as possible.  There were multiple attempts to call the  Efe Ortega [phone], but he did not answer the phone; he was driving from [facility] to [facility] as the patient was being transported by helicopter; therefore the procedure was performed emergently.    mRS baseline unknown at this time.  NIH at [facility] 21, NIH performed at [facility] 14 (see below)

## 2021-03-30 NOTE — CHART NOTE - NSCHARTNOTEFT_GEN_A_CORE
Neurointerventional Surgery Post Procedure Note    Pre- Procedure Diagnosis: Stroke   Post- Procedure Diagnosis: Stroke    : Dr. Ammon MD  Nurse: NICKIE Lackey RN.   Anesthesiologist: ZULY Moffett                                        Radiology Tech: Antoine Vasquez RT, STEVE Esteban RT.    Sheath:  6- Angolan Shealth    I/Os: estimated blood  100cc,  IV fluids 800   cc,   Urine out put  900  cc,   Contrast: Omnipaque 100 cc,      113/83  73, Sinus 100%  RR 14    Preliminary Report:  Under  a  6 Angolan short sheath via the right groin patient underwent cerebral angiogram with mechanical thrombectomy.  Right M1 occlusion identified, and the patient was revascularized TICI 3 after 3 passes.    Patient tolerated procedure well.  Patient remains hemodynamically stable, no change in neurological status compared to baseline.  Results were discussed with patient, patient's family.  Right groin sheath  was discontinued.   Hemostasis was obtained with approximately 21 minutes of manual compression.   Star-close devise deployed   No active bleeding.  Though patient is developing hematomas at the insertion sites of her IVs.   Angio-seal device was applied, quick clot and safeguard balloon dressing applied at 0122  Patient transferred to recovery room in stable condition.    Full dictation to follow. Neurointerventional Surgery Post Procedure Note    Pre- Procedure Diagnosis: Stroke   Post- Procedure Diagnosis: Right M1 occlusion TICI 3     : Dr. Ammon MD  Nurse: NICKIE Lackey RN.   Anesthesiologist: ZULY Moffett                                        Radiology Tech: Antoine Vasquez RT, STEVE Esteban RT.    Sheath:  6- Yemeni Shealth    I/Os: estimated blood  100cc,  IV fluids 800   cc,   Urine out put  900  cc,   Contrast: Omnipaque 100 cc,      113/83  73, Sinus 100%  RR 14    Preliminary Report:  Under  a  6 Yemeni long sheath via the right groin patient underwent cerebral angiogram with mechanical thrombectomy.  Right M1 occlusion identified, and the patient was revascularized TICI 3 after 3 passes.  Patient tolerated procedure well.  Patient remains hemodynamically stable, no change in neurological status compared to baseline.  Results were discussed with patient, patient's family.  Right groin sheath  was discontinued.   Hemostasis was obtained with approximately 21 minutes of manual compression.   Star-close devise deployed   No active bleeding.  Though patient is developing hematomas at the insertion sites of her IVs.  Star-close and quick clot and safeguard balloon dressing applied at 0122  Patient transferred to recovery room in stable condition.    Full dictation to follow.

## 2021-03-30 NOTE — DIETITIAN INITIAL EVALUATION ADULT. - PERTINENT LABORATORY DATA
03-30 Na140 mmol/L Glu 160 mg/dL<H> K+ 3.7 mmol/L Cr  0.65 mg/dL BUN 23.0 mg/dL<H> Phos 3.8 mg/dL Alb n/a   PAB n/a

## 2021-03-30 NOTE — PROGRESS NOTE ADULT - SUBJECTIVE AND OBJECTIVE BOX
I fit Pt's left LE with a PRAFO ,with derotation extension in place. Two tibial length socks provided.   [facility]

## 2021-03-30 NOTE — H&P ADULT - NSHPPHYSICALEXAM_GEN_ALL_CORE
Physical Exam:  Constitutional: NAD    Neuro  * Mental Status:  GCS 15:  E(4), V(1T), M(6).  Awake, alert, intubated.  Follows commands  * Cranial Nerves: Left hemianopsia, midline gaze, does not look leftward with oculocephalic.  Mild facial droop.    * Motor: Right side 5/5.  Left Upper 1/5 to noxious, Left Lower 1/5 to noxious  * Sensory: Decreased sensation on left side  * Reflexes: Not assessed  * Gait: Not assessed      [facility] SS:  DATE: 3/29  TIME: 2330  1A: Level of consciousness (0-3): 0  1B: Questions (0-2): 1    1C: Commands (0-2): 0  2: Gaze (0-2): 1  3: Visual fields (0-3): 2  4: Facial palsy (0-3): 2  MOTOR:  5A: Left arm motor drift (0-4): 3  5B: Right arm motor drift (0-4): 0  6A: Left leg motor drift (0-4): 3  6B: Right leg motor drift (0-4): 0  7: Limb ataxia (0-2): 0  SENSORY:  8: Sensation (0-2): 1  SPEECH:  9: Language (0-3): 0  10: Dysarthria (0-2): 0  EXTINCTION:  11: Extinction/inattention (0-2): 1    TOTAL SCORE: 14

## 2021-03-30 NOTE — CHART NOTE - NSCHARTNOTEFT_GEN_A_CORE
Neurointerventional Surgery  Pre-Procedure Note     HPI:  [id number] who presents as CODE BIPLANE from [facility].  The patient was last seen well per EMS report by  at 8pm when she sustained a fall and was found to have a left facial droop and was unresponsive.  The patient brought to [facility] where code stroke was activated (NIH on presentation 21 a CTA revealed right ICA and MCA (m2 per verbal report) occlusion and received IV TPA prior to activation of [facility] for transfer.  TPA bolus at 2219 per EMS.  TPA infusion started at 2224, and completed prior to arrival of patient to [facility].  Upon arrival taken to [facility] per Dr. Verde request and then taken to angio suite as soon as possible.  There were multiple attempts to call the  Efe Ortega [phone], but he did not answer the phone; he was driving from [facility] to [facility] as the patient was being transported by helicopter; therefore the procedure was performed emergently.    mRS baseline unknown at this time.  NIH at [facility] 21, NIH performed at [facility] 14 (see below)    Allergies: Allergy Status Unknown      PMH/PSH:  PAST MEDICAL & SURGICAL HISTORY:      Social History:   Social History:    FAMILY HISTORY:      Current Medications:   chlorhexidine 0.12% Liquid 15 milliLiter(s) Oral Mucosa every 12 hours  dextrose 40% Gel 15 Gram(s) Oral once  dextrose 5%. 1000 milliLiter(s) IV Continuous <Continuous>  dextrose 5%. 1000 milliLiter(s) IV Continuous <Continuous>  dextrose 50% Injectable 25 Gram(s) IV Push once  dextrose 50% Injectable 12.5 Gram(s) IV Push once  dextrose 50% Injectable 25 Gram(s) IV Push once  glucagon  Injectable 1 milliGRAM(s) IntraMuscular once  insulin lispro (ADMELOG) corrective regimen sliding scale   SubCutaneous every 6 hours  pantoprazole  Injectable 40 milliGRAM(s) IV Push daily  sodium chloride 0.9%. 1000 milliLiter(s) IV Continuous <Continuous>      Physical Exam:  Constitutional: NAD    Neuro  * Mental Status:  GCS 15:  E(4), V(1T), M(6).  Awake, alert, intubated.  Follows commands  * Cranial Nerves: Left hemianopsia, midline gaze, does not look leftward with oculocephalic.  Mild facial droop.    * Motor: Right side 5/5.  Left Upper 1/5 to noxious, Left Lower 1/5 to noxious  * Sensory: Decreased sensation on left side  * Reflexes: Not assessed  * Gait: Not assessed      NIH SS:  DATE: 3/29  TIME: 2330  1A: Level of consciousness (0-3): 0  1B: Questions (0-2): 1    1C: Commands (0-2): 0  2: Gaze (0-2): 1  3: Visual fields (0-3): 2  4: Facial palsy (0-3): 2  MOTOR:  5A: Left arm motor drift (0-4): 3  5B: Right arm motor drift (0-4): 0  6A: Left leg motor drift (0-4): 3  6B: Right leg motor drift (0-4): 0  7: Limb ataxia (0-2): 0  SENSORY:  8: Sensation (0-2): 1  SPEECH:  9: Language (0-3): 0  10: Dysarthria (0-2): 0  EXTINCTION:  11: Extinction/inattention (0-2): 1    TOTAL SCORE: 14    Labs:         Phos  4.4     03-29  Mg     2.0     03-29            PT/INR - ( 29 Mar 2021 23:59 )   PT: 12.7 sec;   INR: 1.10 ratio         PTT - ( 29 Mar 2021 23:59 )  PTT:29.1 sec    Blood Bank:         Assessment/Plan:   This is a 82y  year old right / left hand dominant Female  presents with right ICA and MCA occlusion, code stroke at [facility], received IV TPA.  Transfer to [facility] for neurointerventional.  Patient presents to neuro-IR for selective cerebral angiography.     There were multiple attempts to call the  Efe Ortega [phone], but he did not answer the phone; he was driving from [facility] to [facility] as the patient was being transported by helicopter; therefore the procedure was performed emergently. Neurointerventional Surgery  Pre-Procedure Note     HPI:  [id number] who presents as CODE BIPLANE from [facility].  The patient was last seen well per EMS report by  at 8pm when she sustained a fall and was found to have a left facial droop and was unresponsive.  The patient brought to [facility] where code stroke was activated (NIH on presentation 21 a CTA revealed right ICA and MCA (m2 per verbal report) occlusion and received IV TPA prior to activation of [facility] for transfer.  TPA bolus at 2219 per EMS.  TPA infusion started at 2224, and completed prior to arrival of patient to [facility].  Upon arrival taken to [facility] per Dr. Verde request and then taken to angio suite as soon as possible.  There were multiple attempts to call the  Efe Ortega [phone], but he did not answer the phone; he was driving from [facility] to [facility] as the patient was being transported by helicopter; therefore the procedure was performed emergently.    mRS baseline unknown at this time.  NIH at [facility] 21, NIH performed at [facility] 14 (see below)    Allergies: Allergy Status Unknown      PMH/PSH:  PAST MEDICAL & SURGICAL HISTORY:      Social History:   Social History:    FAMILY HISTORY:      Current Medications:   chlorhexidine 0.12% Liquid 15 milliLiter(s) Oral Mucosa every 12 hours  dextrose 40% Gel 15 Gram(s) Oral once  dextrose 5%. 1000 milliLiter(s) IV Continuous <Continuous>  dextrose 5%. 1000 milliLiter(s) IV Continuous <Continuous>  dextrose 50% Injectable 25 Gram(s) IV Push once  dextrose 50% Injectable 12.5 Gram(s) IV Push once  dextrose 50% Injectable 25 Gram(s) IV Push once  glucagon  Injectable 1 milliGRAM(s) IntraMuscular once  insulin lispro (ADMELOG) corrective regimen sliding scale   SubCutaneous every 6 hours  pantoprazole  Injectable 40 milliGRAM(s) IV Push daily  sodium chloride 0.9%. 1000 milliLiter(s) IV Continuous <Continuous>      Physical Exam:  Constitutional: NAD    Neuro  * Mental Status:  GCS 15:  E(4), V(1T), M(6).  Awake, alert, intubated.  Follows commands  * Cranial Nerves: Left hemianopsia, midline gaze, does not look leftward with oculocephalic.  Mild facial droop.    * Motor: Right side 5/5.  Left Upper 1/5 to noxious, Left Lower 1/5 to noxious  * Sensory: Decreased sensation on left side  * Reflexes: Not assessed  * Gait: Not assessed      NIH SS:  DATE: 3/29  TIME: 2330  1A: Level of consciousness (0-3): 0  1B: Questions (0-2): 1    1C: Commands (0-2): 0  2: Gaze (0-2): 1  3: Visual fields (0-3): 2  4: Facial palsy (0-3): 2  MOTOR:  5A: Left arm motor drift (0-4): 3  5B: Right arm motor drift (0-4): 0  6A: Left leg motor drift (0-4): 3  6B: Right leg motor drift (0-4): 0  7: Limb ataxia (0-2): 0  SENSORY:  8: Sensation (0-2): 1  SPEECH:  9: Language (0-3): 0  10: Dysarthria (0-2): 0  EXTINCTION:  11: Extinction/inattention (0-2): 1    TOTAL SCORE: 14    Labs:         Phos  4.4     03-29  Mg     2.0     03-29            PT/INR - ( 29 Mar 2021 23:59 )   PT: 12.7 sec;   INR: 1.10 ratio         PTT - ( 29 Mar 2021 23:59 )  PTT:29.1 sec    Blood Bank:         Assessment/Plan:   This is a 82y  male who presents with right ICA and MCA occlusion, code stroke at [facility], received IV TPA.  Transfer to [facility] for neurointerventional.  Patient presents to neuro-IR for selective cerebral angiography.     There were multiple attempts to call the  Efe Ortega [phone], but he did not answer the phone; he was driving from [facility] to [facility] as the patient was being transported by helicopter; therefore the procedure was performed emergently.

## 2021-03-30 NOTE — PHARMACOTHERAPY INTERVENTION NOTE - COMMENTS
Spoke with patient's  over the phone for medication list. Patient's  reports that patient takes a lot of homeopathic medications prescribed to her by Dr. Wyatt Myrick.  Patient's  also reports patient takes sotalol for afib, baby aspirin, and temazepam. Spoke with patient's  over the phone for medication list. Patient's  reports that patient takes a lot of homeopathic medications prescribed to her by Dr. Wyatt Myrick.  Patient's  also reports patient takes sotalol for afib, baby aspirin, and temazepam.  Patient's  also reported patient is allergic to macrolide and fluoroquinolone antibiotics. Spoke with patient's  over the phone for medication list. Patient's  reports that patient takes a lot of homeopathic medications prescribed to her by Dr. Wyatt Myrick.  Patient's  also reports patient takes sotalol for afib, baby aspirin, and temazepam.  Patient's  also reported patient is allergic to macrolide and fluoroquinolone antibiotics.        star stone

## 2021-03-31 NOTE — PROGRESS NOTE ADULT - ASSESSMENT
82F S/P R M1 occlusion ; S/P alteplase given 2219 ; TICI- 3 thrombectomy 3/30    Plan   Remain in ICU   q2 neuro checks   MRI Brain pending   CT head 24hrs post TPA performed and shows no hemorrhage, okay to start ASA/Lovenox   -140   Okay for diet   No full dose AC at this time   Possible palliative consult / GOC discussion

## 2021-03-31 NOTE — PROGRESS NOTE ADULT - SUBJECTIVE AND OBJECTIVE BOX
Chief complaint:   Patient is a 82y old  Female who presents with a chief complaint of Stroke, Code BIPLANE (30 Mar 2021 16:05)    HPI:  [id number] who presents as CODE BIPLANE from [facility].  The patient was last seen well per EMS report by  at 8pm when she sustained a fall and was found to have a left facial droop and was unresponsive.  The patient brought to [facility] where code stroke was activated (NIH on presentation 21 a CTA revealed right ICA and MCA (m2 per verbal report) occlusion and received IV TPA prior to activation of [facility] for transfer.  TPA bolus at 2219 per EMS.  TPA infusion started at 2224, and completed prior to arrival of patient to [facility].  Upon arrival taken to [place] per Dr. Verde request and then taken to angio suite as soon as possible.  There were multiple attempts to call the  Efe Ortega [phone], but he did not answer the phone; he was driving from [facility] to [facility] as the patient was being transported by helicopter; therefore the procedure was performed emergently.    mRS baseline unknown at this time.  NIH at [facility] 21, NIH performed at [facility] 14 (see below) (30 Mar 2021 00:39)        24hr EVENTS:      ROS: [ ]  Unable to assess due to mental status   All other systems negative    -----------------------------------------------------------------------------------------------------------------------------------------------------------------------------------  ICU Vital Signs Last 24 Hrs  T(C): 37.5 (31 Mar 2021 07:59), Max: 38.1 (30 Mar 2021 18:00)  T(F): 99.5 (31 Mar 2021 07:59), Max: 100.6 (30 Mar 2021 18:00)  HR: 72 (31 Mar 2021 07:00) (58 - 74)  BP: 106/68 (31 Mar 2021 07:00) (100/65 - 140/84)  BP(mean): 80 (31 Mar 2021 07:00) (68 - 107)  ABP: 113/52 (30 Mar 2021 21:25) (111/66 - 136/79)  ABP(mean): 78 (30 Mar 2021 21:25) (78 - 102)  RR: 15 (31 Mar 2021 07:00) (10 - 28)  SpO2: 100% (31 Mar 2021 07:00) (97% - 100%)      I&O's Summary    30 Mar 2021 07:01  -  31 Mar 2021 07:00  --------------------------------------------------------  IN: 1515 mL / OUT: 915 mL / NET: 600 mL        MEDICATIONS  (STANDING):  ascorbic acid 500 milliGRAM(s) Oral daily  chlorhexidine 0.12% Liquid 15 milliLiter(s) Oral Mucosa every 12 hours  dextrose 40% Gel 15 Gram(s) Oral once  dextrose 5%. 1000 milliLiter(s) (50 mL/Hr) IV Continuous <Continuous>  dextrose 5%. 1000 milliLiter(s) (100 mL/Hr) IV Continuous <Continuous>  dextrose 50% Injectable 25 Gram(s) IV Push once  dextrose 50% Injectable 12.5 Gram(s) IV Push once  dextrose 50% Injectable 25 Gram(s) IV Push once  glucagon  Injectable 1 milliGRAM(s) IntraMuscular once  insulin lispro (ADMELOG) corrective regimen sliding scale   SubCutaneous every 6 hours  lactated ringers. 1000 milliLiter(s) (60 mL/Hr) IV Continuous <Continuous>  multivitamin/minerals/iron Oral Solution (CENTRUM) 15 milliLiter(s) Oral daily  pantoprazole  Injectable 40 milliGRAM(s) IV Push daily  polyethylene glycol 3350 17 Gram(s) Oral daily  senna 2 Tablet(s) Oral at bedtime      RESPIRATORY:  Mode: AC/ CMV (Assist Control/ Continuous Mandatory Ventilation)  RR (machine): 14  TV (machine): 450  FiO2: 40  PEEP: 5  MAP: 9  PIP: 26        IMAGING:   Recent imaging studies were reviewed.    LAB RESULTS:                          8.8    17.88 )-----------( 154      ( 31 Mar 2021 05:12 )             26.9       PT/INR - ( 29 Mar 2021 23:59 )   PT: 12.7 sec;   INR: 1.10 ratio         PTT - ( 29 Mar 2021 23:59 )  PTT:29.1 sec    03-31    142  |  110<H>  |  32.0<H>  ----------------------------<  157<H>  4.3   |  21.0<L>  |  0.98    Ca    8.0<L>      31 Mar 2021 05:12  Phos  3.3     03-31  Mg     2.6     03-31      -----------------------------------------------------------------------------------------------------------------------------------------------------------------------------------    PHYSICAL EXAM:  General: Calm, intubated  HEENT: MMM  Neuro:  -Mental status- No acute distress, follows commands  -CN- PERRL 3mm, tongue midline, R gaze pref  RUE/RLE 5/5  LUE loc  LLE 1/5  CV: RRR  Pulm: clear to auscultation  Abd: Soft, nontender, nondistended  Ext: no noted edema in lower ext  Skin: warm, dry       Chief complaint:   Patient is a 82y old  Female who presents with a chief complaint of Stroke, Code RIOC (30 Mar 2021 16:05)    mRS baseline unknown at this time.  NIH at [facility] 21, NIH performed at [facility] 14 (see below) (30 Mar 2021 00:39)      24hr EVENTS:  extubated    ROS: x[ ]  Unable to assess due to mental status   All other systems negative    -----------------------------------------------------------------------------------------------------------------------------------------------------------------------------------  ICU Vital Signs Last 24 Hrs  T(C): 37.5 (31 Mar 2021 07:59), Max: 38.1 (30 Mar 2021 18:00)  T(F): 99.5 (31 Mar 2021 07:59), Max: 100.6 (30 Mar 2021 18:00)  HR: 72 (31 Mar 2021 07:00) (58 - 74)  BP: 106/68 (31 Mar 2021 07:00) (100/65 - 140/84)  BP(mean): 80 (31 Mar 2021 07:00) (68 - 107)  ABP: 113/52 (30 Mar 2021 21:25) (111/66 - 136/79)  ABP(mean): 78 (30 Mar 2021 21:25) (78 - 102)  RR: 15 (31 Mar 2021 07:00) (10 - 28)  SpO2: 100% (31 Mar 2021 07:00) (97% - 100%)      I&O's Summary    30 Mar 2021 07:01  -  31 Mar 2021 07:00  --------------------------------------------------------  IN: 1515 mL / OUT: 915 mL / NET: 600 mL        MEDICATIONS  (STANDING):  ascorbic acid 500 milliGRAM(s) Oral daily  chlorhexidine 0.12% Liquid 15 milliLiter(s) Oral Mucosa every 12 hours  dextrose 40% Gel 15 Gram(s) Oral once  dextrose 5%. 1000 milliLiter(s) (50 mL/Hr) IV Continuous <Continuous>  dextrose 5%. 1000 milliLiter(s) (100 mL/Hr) IV Continuous <Continuous>  dextrose 50% Injectable 25 Gram(s) IV Push once  dextrose 50% Injectable 12.5 Gram(s) IV Push once  dextrose 50% Injectable 25 Gram(s) IV Push once  glucagon  Injectable 1 milliGRAM(s) IntraMuscular once  insulin lispro (ADMELOG) corrective regimen sliding scale   SubCutaneous every 6 hours  lactated ringers. 1000 milliLiter(s) (60 mL/Hr) IV Continuous <Continuous>  multivitamin/minerals/iron Oral Solution (CENTRUM) 15 milliLiter(s) Oral daily  pantoprazole  Injectable 40 milliGRAM(s) IV Push daily  polyethylene glycol 3350 17 Gram(s) Oral daily  senna 2 Tablet(s) Oral at bedtime      RESPIRATORY:  Mode: AC/ CMV (Assist Control/ Continuous Mandatory Ventilation)  RR (machine): 14  TV (machine): 450  FiO2: 40  PEEP: 5  MAP: 9  PIP: 26        IMAGING:   Recent imaging studies were reviewed.    LAB RESULTS:                          8.8    17.88 )-----------( 154      ( 31 Mar 2021 05:12 )             26.9       PT/INR - ( 29 Mar 2021 23:59 )   PT: 12.7 sec;   INR: 1.10 ratio         PTT - ( 29 Mar 2021 23:59 )  PTT:29.1 sec    03-31    142  |  110<H>  |  32.0<H>  ----------------------------<  157<H>  4.3   |  21.0<L>  |  0.98    Ca    8.0<L>      31 Mar 2021 05:12  Phos  3.3     03-31  Mg     2.6     03-31      -----------------------------------------------------------------------------------------------------------------------------------------------------------------------------------    PHYSICAL EXAM:  General: Calm  HEENT: MMM  Neuro:  -Mental status- No acute distress, follows commands  -CN- PERRL 3mm, tongue midline, R gaze pref, L facial  RUE/RLE 5/5  LUE loc  LLE 1/5  CV: RRR  Pulm: clear to auscultation  Abd: Soft, nontender, nondistended  Ext: no noted edema in lower ext  Skin: warm, dry

## 2021-03-31 NOTE — PROGRESS NOTE ADULT - SUBJECTIVE AND OBJECTIVE BOX
HPI: 80F who presents as CODE BIPLANE from [facility].  The patient was last seen well per EMS report by  at 8pm when she sustained a fall and was found to have a left facial droop and was unresponsive.  The patient brought to [facility] where code stroke was activated (NIH on presentation 21 a CTA revealed right ICA and MCA (m2 per verbal report) occlusion and received IV TPA prior to activation of [facility] for transfer.  TPA bolus at 2219 per EMS.  TPA infusion started at 2224, and completed prior to arrival of patient to [facility]. HPI: 80F who presents as CODE BIPLANE from [facility].  The patient was last seen well per EMS report by  at 8pm when she sustained a fall and was found to have a left facial droop and was unresponsive.  The patient brought to [facility] where code stroke was activated (NIH on presentation 21 a CTA revealed right ICA and MCA (m2 per verbal report) occlusion and received IV TPA prior to activation of [facility] for transfer.  TPA bolus at 2219 per EMS.  TPA infusion started at 2224, and completed prior to arrival of patient to [facility].    Interval History: patient passed CPAP trial, leak, and weaning parameters, and was extubated. However, pt had difficulty controlling her secretions and upper airway, she desaturated, did not respond to suctioning and meds was re-intubated.     Vital Signs Last 24 Hrs  T(C): 37.4 (31 Mar 2021 12:00), Max: 38.1 (30 Mar 2021 18:00)  T(F): 99.3 (31 Mar 2021 12:00), Max: 100.6 (30 Mar 2021 18:00)  HR: 83 (31 Mar 2021 14:00) (58 - 117)  BP: 99/73 (31 Mar 2021 14:00) (99/73 - 133/68)  BP(mean): 83 (31 Mar 2021 14:00) (68 - 104)  RR: 14 (31 Mar 2021 14:00) (13 - 28)  SpO2: 97% (31 Mar 2021 14:00) (93% - 100%)    Physical Exam   General: Calm  HEENT: MMM  Neuro:  -Mental status- No acute distress, follows commands  -CN- PERRL 3mm, tongue midline, R gaze pref, L facial  RUE/RLE 5/5  LUE loc  LLE 1/5  CV: RRR  Pulm: clear to auscultation  Abd: Soft, nontender, nondistended  Ext: no noted edema in lower ext  Skin: warm, dry    MEDICATIONS  (STANDING):  ascorbic acid 500 milliGRAM(s) Oral daily  aspirin  chewable 81 milliGRAM(s) Oral daily  atorvastatin 40 milliGRAM(s) Oral at bedtime  chlorhexidine 0.12% Liquid 15 milliLiter(s) Oral Mucosa every 12 hours  dexMEDEtomidine Infusion 0.05 MICROgram(s)/kG/Hr (0.76 mL/Hr) IV Continuous <Continuous>  dextrose 40% Gel 15 Gram(s) Oral once  dextrose 5%. 1000 milliLiter(s) (50 mL/Hr) IV Continuous <Continuous>  dextrose 5%. 1000 milliLiter(s) (100 mL/Hr) IV Continuous <Continuous>  dextrose 50% Injectable 25 Gram(s) IV Push once  dextrose 50% Injectable 12.5 Gram(s) IV Push once  dextrose 50% Injectable 25 Gram(s) IV Push once  enoxaparin Injectable 40 milliGRAM(s) SubCutaneous daily  glucagon  Injectable 1 milliGRAM(s) IntraMuscular once  insulin lispro (ADMELOG) corrective regimen sliding scale   SubCutaneous every 6 hours  lactated ringers. 1000 milliLiter(s) (60 mL/Hr) IV Continuous <Continuous>  multivitamin/minerals/iron Oral Solution (CENTRUM) 15 milliLiter(s) Oral daily  pantoprazole  Injectable 40 milliGRAM(s) IV Push daily  polyethylene glycol 3350 17 Gram(s) Oral daily  senna 2 Tablet(s) Oral at bedtime  sotalol 80 milliGRAM(s) Oral every 12 hours  sotalol 80 milliGRAM(s) Oral once    MEDICATIONS  (PRN):  acetaminophen    Suspension .. 650 milliGRAM(s) Oral every 6 hours PRN Temp greater or equal to 38C (100.4F), Mild Pain (1 - 3)

## 2021-03-31 NOTE — PROGRESS NOTE ADULT - ASSESSMENT
ASSESSMENT/PLAN: S/P R M1 occlusion ; S/P alteplase given 2219 ; TICI- 3 thrombectomy     NEURO:  Neuro q 1 hr  CT at 10 pm   Stroke core measures  MRI of brain  No AED needed  Activity:  [X] PT [X] OT / Splinting L leg     PULM: Intubated for airway protection   Weaning parameters   Cuff leak   Wean to extubate     CV:  hx of afib---> sinus   SBP goal- 110-140  QTC - 515 if QTc improves restrat sotolal 80mg q 12  Change to metoprolol for now   ECHO     RENAL: Cr stable   Na goal 135- 145   Fluids: NS at 75 cc/hr     GI: NPO for possible extubation   Diet:  GI prophylaxis [X] not indicated   Bowel regimen  [X] senna [    ENDO:   Monitor FS 48 hrs- low SSI q 6   Goal euglycemia (-180)    HEME/ONC:  VTE prophylaxis: [X] SCDs [] chemoprophylaxis- hold till F/U Ct    ID:  Afeb    SOCIAL/FAMILY: [x] family meeting    CODE STATUS:  [x] Full Code     DISPOSITION:  [X] ICU     []x Patient is at high risk of neurologic deterioration/death due to: brain swelling and edema    Time spent:  40critical care minutes ASSESSMENT/PLAN: S/P R M1 occlusion ; S/P alteplase given 2219 ; TICI- 3 thrombectomy 3/30    NEURO:  Neuro q 1 hr  start ASA, statin  Stroke core measures  MRI of brain  Activity:  [X] PT [X] OT / Splinting L leg     PULM: wean to RA as tolerated  Weaning parameters   SpO2>92%  place NGT    CV:  hx of afib---> sinus   SBP goal- 110-140  QTC - 467 (sotalol home)  Change to metoprolol for now   ECHO: EF 55-60%    RENAL: Cr stable   Na goal 135- 145   Fluids: NS at 60 cc/hr   DC guevara    GI: NPO for possible extubation   Diet:  GI prophylaxis [X] not indicated   Bowel regimen  [X] senna [    ENDO:   Monitor FS 48 hrs- low SSI q 6   Goal euglycemia (-180)    HEME/ONC:  VTE prophylaxis: [X] SCDs [] chemoprophylaxis-    LD dopplers    ID:  Afeb  no leukocytosis    SOCIAL/FAMILY: [x] family meeting    CODE STATUS:  [x] Full Code     DISPOSITION:  [X] ICU     []x Patient is at high risk of neurologic deterioration/death due to: brain swelling and edema    Time spent:  40critical care minutes

## 2021-03-31 NOTE — PROCEDURE NOTE - NSINFORMCONSENT_GEN_A_CORE
Benefits, risks, and possible complications of procedure explained to patient/caregiver who verbalized understanding and gave verbal consent.
 Efe Canchola/Benefits, risks, and possible complications of procedure explained to patient/caregiver who verbalized understanding and gave verbal consent.

## 2021-03-31 NOTE — PROCEDURE NOTE - NSINDICATIONS_GEN_A_CORE
arterial puncture to obtain ABG's/blood sampling/critical patient/monitoring purposes
airway protection/mental status change

## 2021-03-31 NOTE — PROCEDURE NOTE - NSPROCDETAILS_GEN_ALL_CORE
location identified, draped/prepped, sterile technique used, needle inserted/introduced/positive blood return obtained via catheter/connected to a pressurized flush line/sutured in place/hemostasis with direct pressure, dressing applied/Seldinger technique/all materials/supplies accounted for at end of procedure
patient pre-oxygenated, tube inserted, placement confirmed

## 2021-03-31 NOTE — CHART NOTE - NSCHARTNOTEFT_GEN_A_CORE
I spoke with patient's  and family. They are aware she was extubated and now requires reintubation due to poor secretion management. I spoke extensively with her  regarding her wishes. He said they have talked about end of life and that she "wants to live" even if that means on machines. He understands that her course may require and trach/PEG and says this is what she would want if needed. We also discussed if her heart were to stop, and he confirmed consent for CPR. Maintain full code status.

## 2021-04-01 NOTE — CONSULT NOTE ADULT - TIME BILLING
coordination of care plan with multiple providers. discussions with multiple staff members. review of chart and imaging studies. symptomatic management in patient at end of life.

## 2021-04-01 NOTE — PROGRESS NOTE ADULT - ASSESSMENT
82F S/P R M1 occlusion ; S/P alteplase given 2219 ; TICI- 3 thrombectomy 3/30    Plan   MRI brain with right MCA hemispheric stroke- patient is left hand dominant  Palliative consulted  GOC discussion today with  and daughter at the bedside. Patient is expressing her wishes to be extubated and expresses understanding that this may be terminal  MOLST filled out in chart, DNR/DNI  Leaning toward Comfort Measures- terminal extubation but want to wait for grandchildren to arrive from out of state tomorrow, patient in agreement with this  No further neurointerventional needs, please recall as needed

## 2021-04-01 NOTE — CONSULT NOTE ADULT - CONSULT REASON
"Would need trach /peg pt does not want, family says she previously said she wanted everything done and live on machines"

## 2021-04-01 NOTE — PROGRESS NOTE ADULT - SUBJECTIVE AND OBJECTIVE BOX
HPI:  80F who presents as CODE BIPLANE from [facility].  The patient was last seen well per EMS report by  at 8pm when she sustained a fall and was found to have a left facial droop and was unresponsive.  The patient brought to [facility] where code stroke was activated (NIH on presentation 21 a CTA revealed right ICA and MCA (m2 per verbal report) occlusion and received IV TPA prior to activation of [facility] for transfer.  TPA bolus at 2219 per EMS.  TPA infusion started at 2224, and completed prior to arrival of patient to [facility].  Upon arrival taken to [facility] per Dr. Verde request and then taken to angio suite as soon as possible.  There were multiple attempts to call the  Efe Ortega [phone], but he did not answer the phone; he was driving from [facility] to [facility] as the patient was being transported by helicopter; therefore the procedure was performed emergently.    mRS baseline unknown at this time.  NIH at [facility] 21, NIH performed at [facility] 14 (see below) (30 Mar 2021 00:39)      INTERVAL HPI/OVERNIGHT EVENTS:  82y Female examined by neurointerventional team. Patient had been extubated and reintubated yesterday. Patient was having difficulty clearing secretions secondary to pharyngeal muscle weakness. Patient is now expressing her wishes to be extubated, writing down her wishes to take the tube out even if it means she will pass on. Patient's family is at the bedside.     Vital Signs Last 24 Hrs  T(C): 38.1 (01 Apr 2021 12:01), Max: 38.1 (01 Apr 2021 12:01)  T(F): 100.6 (01 Apr 2021 12:01), Max: 100.6 (01 Apr 2021 12:01)  HR: 86 (01 Apr 2021 14:00) (69 - 103)  BP: 144/90 (01 Apr 2021 14:00) (68/18 - 151/138)  BP(mean): 107 (01 Apr 2021 14:00) (31 - 145)  RR: 19 (01 Apr 2021 14:00) (14 - 26)  SpO2: 100% (01 Apr 2021 14:00) (86% - 100%)    PHYSICAL EXAM:  General: Calm  HEENT: MMM  Neuro:  -Mental status- No acute distress, follows commands  -CN- PERRL 3mm, tongue midline, R gaze pref, L facial  RUE/RLE 5/5  LUE loc  LLE 1/5  CV: RRR  Pulm: clear to auscultation  Abd: Soft, nontender, nondistended  Ext: no noted edema in lower ext  Skin: warm, dry    LABS:                        8.0    14.81 )-----------( 143      ( 01 Apr 2021 05:50 )             24.8     04-01    142  |  110<H>  |  25.0<H>  ----------------------------<  150<H>  3.7   |  22.0  |  0.67    Ca    7.3<L>      01 Apr 2021 04:23  Phos  1.5     04-01  Mg     2.1     04-01      PT/INR - ( 31 Mar 2021 17:03 )   PT: 13.2 sec;   INR: 1.15 ratio         PTT - ( 31 Mar 2021 17:03 )  PTT:21.6 sec      03-31 @ 07:01  -  04-01 @ 07:00  --------------------------------------------------------  IN: 2704.3 mL / OUT: 2780 mL / NET: -75.7 mL    04-01 @ 07:01  -  04-01 @ 14:34  --------------------------------------------------------  IN: 1399.8 mL / OUT: 450 mL / NET: 949.8 mL        RADIOLOGY & ADDITIONAL TESTS:        < from: MR Head No Cont (04.01.21 @ 11:39) >    IMPRESSION: Acute right MCA territory infarct.        < end of copied text >

## 2021-04-01 NOTE — CONSULT NOTE ADULT - SUBJECTIVE AND OBJECTIVE BOX
HPI:    PERTINENT PMH REVIEWED: Yes No    PAST MEDICAL & SURGICAL HISTORY:  Hemorrhage in the brain    No significant past surgical history        SOCIAL HISTORY:                                     Admitted from:  home  SNF  [facility]     Surrogate/HCP/Guardian: Phone#:    FAMILY HISTORY:      Baseline ADLs (prior to admission):  Independent/ Dependent      Allergies    azithromycin (Unknown)  clarithromycin (Unknown)  erythromycin (Unknown)  fluoroquinolone antibiotics (Unknown)    Intolerances        Present Symptoms:     Dyspnea: 0 1 2 3   Nausea/Vomiting: Yes No  Anxiety:  Yes No  Depression: Yes No  Fatigue: Yes No  Loss of appetite: Yes No    Pain:             Character-            Duration-            Effect-            Factors-            Frequency-            Location-            Severity-    Review of Systems: Reviewed                     Negative:                     Positive:  Unable to obtain due to poor mentation   All others negative    MEDICATIONS  (STANDING):  ascorbic acid 500 milliGRAM(s) Oral daily  aspirin  chewable 81 milliGRAM(s) Oral daily  atorvastatin 40 milliGRAM(s) Oral at bedtime  chlorhexidine 0.12% Liquid 15 milliLiter(s) Oral Mucosa every 12 hours  dextrose 40% Gel 15 Gram(s) Oral once  dextrose 5%. 1000 milliLiter(s) (50 mL/Hr) IV Continuous <Continuous>  dextrose 5%. 1000 milliLiter(s) (100 mL/Hr) IV Continuous <Continuous>  dextrose 50% Injectable 25 Gram(s) IV Push once  dextrose 50% Injectable 12.5 Gram(s) IV Push once  dextrose 50% Injectable 25 Gram(s) IV Push once  enoxaparin Injectable 40 milliGRAM(s) SubCutaneous daily  glucagon  Injectable 1 milliGRAM(s) IntraMuscular once  insulin lispro (ADMELOG) corrective regimen sliding scale   SubCutaneous every 6 hours  lactated ringers. 1000 milliLiter(s) (60 mL/Hr) IV Continuous <Continuous>  midazolam Injectable 1 milliGRAM(s) IV Push once  multivitamin/minerals/iron Oral Solution (CENTRUM) 15 milliLiter(s) Oral daily  pantoprazole  Injectable 40 milliGRAM(s) IV Push daily  polyethylene glycol 3350 17 Gram(s) Oral daily  senna 2 Tablet(s) Oral at bedtime  sotalol 80 milliGRAM(s) Oral every 12 hours    MEDICATIONS  (PRN):  acetaminophen    Suspension .. 650 milliGRAM(s) Oral every 6 hours PRN Temp greater or equal to 38C (100.4F), Mild Pain (1 - 3)  fentaNYL    Injectable 12.5 MICROGram(s) IV Push every 4 hours PRN Moderate Pain (4 - 6)      PHYSICAL EXAM:    Vital Signs Last 24 Hrs  T(C): 36.4 (01 Apr 2021 07:16), Max: 38 (31 Mar 2021 16:00)  T(F): 97.5 (01 Apr 2021 07:16), Max: 100.4 (31 Mar 2021 16:00)  HR: 98 (01 Apr 2021 10:00) (69 - 117)  BP: 136/80 (01 Apr 2021 09:00) (68/18 - 136/80)  BP(mean): 98 (01 Apr 2021 09:00) (31 - 104)  RR: 20 (01 Apr 2021 10:00) (14 - 23)  SpO2: 98% (01 Apr 2021 10:00) (86% - 100%)    General: alert  oriented x ____ lethargic agitated                  cachexia  nonverbal  coma    Karnofsky:  %    HEENT: normal  dry mouth  ET tube/trach    Lungs: comfortable tachypnea/labored breathing  excessive secretions    CV: normal  tachycardia    GI: normal  distended  tender  no BS               PEG/NG/OG tube  constipation  last BM:     : normal  incontinent  oliguria/anuria  guevara    MSK: normal  weakness  edema             ambulatory  bedbound/wheelchair bound    Skin: normal  pressure ulcers- Stage_____  no rash    LABS:                        8.0    14.81 )-----------( 143      ( 01 Apr 2021 05:50 )             24.8     04-01    142  |  110<H>  |  25.0<H>  ----------------------------<  150<H>  3.7   |  22.0  |  0.67    Ca    7.3<L>      01 Apr 2021 04:23  Phos  1.5     04-01  Mg     2.1     04-01      PT/INR - ( 31 Mar 2021 17:03 )   PT: 13.2 sec;   INR: 1.15 ratio         PTT - ( 31 Mar 2021 17:03 )  PTT:21.6 sec    I&O's Summary    31 Mar 2021 07:01  -  01 Apr 2021 07:00  --------------------------------------------------------  IN: 2704.3 mL / OUT: 2780 mL / NET: -75.7 mL    01 Apr 2021 07:01  -  01 Apr 2021 10:20  --------------------------------------------------------  IN: 280 mL / OUT: 300 mL / NET: -20 mL        RADIOLOGY & ADDITIONAL STUDIES:    ADVANCE DIRECTIVES:   DNR YES NO  Completed on:                     NIESHA  YES NO   Completed on:  Living Will  YES NO   Completed on:       HPI: 82F with PMH as listed admitted 3/29 from [facility] as code [place].     PERTINENT PMH REVIEWED: Yes     PAST MEDICAL & SURGICAL HISTORY:  Hemorrhage in the brain    No significant past surgical history        SOCIAL HISTORY:                                     Admitted from:  home  SNF  [facility]     Surrogate/HCP/Guardian: Phone#:    FAMILY HISTORY:      Baseline ADLs (prior to admission):  Independent/ Dependent      Allergies    azithromycin (Unknown)  clarithromycin (Unknown)  erythromycin (Unknown)  fluoroquinolone antibiotics (Unknown)    Intolerances        Present Symptoms:     Dyspnea: 0 1 2 3   Nausea/Vomiting: Yes No  Anxiety:  Yes No  Depression: Yes No  Fatigue: Yes No  Loss of appetite: Yes No    Pain:             Character-            Duration-            Effect-            Factors-            Frequency-            Location-            Severity-    Review of Systems: Reviewed                     Negative:                     Positive:  Unable to obtain due to poor mentation   All others negative    MEDICATIONS  (STANDING):  ascorbic acid 500 milliGRAM(s) Oral daily  aspirin  chewable 81 milliGRAM(s) Oral daily  atorvastatin 40 milliGRAM(s) Oral at bedtime  chlorhexidine 0.12% Liquid 15 milliLiter(s) Oral Mucosa every 12 hours  dextrose 40% Gel 15 Gram(s) Oral once  dextrose 5%. 1000 milliLiter(s) (50 mL/Hr) IV Continuous <Continuous>  dextrose 5%. 1000 milliLiter(s) (100 mL/Hr) IV Continuous <Continuous>  dextrose 50% Injectable 25 Gram(s) IV Push once  dextrose 50% Injectable 12.5 Gram(s) IV Push once  dextrose 50% Injectable 25 Gram(s) IV Push once  enoxaparin Injectable 40 milliGRAM(s) SubCutaneous daily  glucagon  Injectable 1 milliGRAM(s) IntraMuscular once  insulin lispro (ADMELOG) corrective regimen sliding scale   SubCutaneous every 6 hours  lactated ringers. 1000 milliLiter(s) (60 mL/Hr) IV Continuous <Continuous>  midazolam Injectable 1 milliGRAM(s) IV Push once  multivitamin/minerals/iron Oral Solution (CENTRUM) 15 milliLiter(s) Oral daily  pantoprazole  Injectable 40 milliGRAM(s) IV Push daily  polyethylene glycol 3350 17 Gram(s) Oral daily  senna 2 Tablet(s) Oral at bedtime  sotalol 80 milliGRAM(s) Oral every 12 hours    MEDICATIONS  (PRN):  acetaminophen    Suspension .. 650 milliGRAM(s) Oral every 6 hours PRN Temp greater or equal to 38C (100.4F), Mild Pain (1 - 3)  fentaNYL    Injectable 12.5 MICROGram(s) IV Push every 4 hours PRN Moderate Pain (4 - 6)      PHYSICAL EXAM:    Vital Signs Last 24 Hrs  T(C): 36.4 (01 Apr 2021 07:16), Max: 38 (31 Mar 2021 16:00)  T(F): 97.5 (01 Apr 2021 07:16), Max: 100.4 (31 Mar 2021 16:00)  HR: 98 (01 Apr 2021 10:00) (69 - 117)  BP: 136/80 (01 Apr 2021 09:00) (68/18 - 136/80)  BP(mean): 98 (01 Apr 2021 09:00) (31 - 104)  RR: 20 (01 Apr 2021 10:00) (14 - 23)  SpO2: 98% (01 Apr 2021 10:00) (86% - 100%)    General: alert  oriented x ____ lethargic agitated                  cachexia  nonverbal  coma    Karnofsky:  %    HEENT: normal  dry mouth  ET tube/trach    Lungs: comfortable tachypnea/labored breathing  excessive secretions    CV: normal  tachycardia    GI: normal  distended  tender  no BS               PEG/NG/OG tube  constipation  last BM:     : normal  incontinent  oliguria/anuria  guevara    MSK: normal  weakness  edema             ambulatory  bedbound/wheelchair bound    Skin: normal  pressure ulcers- Stage_____  no rash    LABS:                        8.0    14.81 )-----------( 143      ( 01 Apr 2021 05:50 )             24.8     04-01    142  |  110<H>  |  25.0<H>  ----------------------------<  150<H>  3.7   |  22.0  |  0.67    Ca    7.3<L>      01 Apr 2021 04:23  Phos  1.5     04-01  Mg     2.1     04-01      PT/INR - ( 31 Mar 2021 17:03 )   PT: 13.2 sec;   INR: 1.15 ratio         PTT - ( 31 Mar 2021 17:03 )  PTT:21.6 sec    I&O's Summary    31 Mar 2021 07:01  -  01 Apr 2021 07:00  --------------------------------------------------------  IN: 2704.3 mL / OUT: 2780 mL / NET: -75.7 mL    01 Apr 2021 07:01  -  01 Apr 2021 10:20  --------------------------------------------------------  IN: 280 mL / OUT: 300 mL / NET: -20 mL        RADIOLOGY & ADDITIONAL STUDIES:    ADVANCE DIRECTIVES:   DNR YES NO  Completed on:                     NIESHA  YES NO   Completed on:  Living Will  YES NO   Completed on:       HPI: 82F with PMH as listed [place] 3[place] [facility] [place]. CTA showed right ICA and MCA occlusion s/p TPA prior to arrival s/p thrombectomy 3/30. She is s/p reintubation yesterday for airway clearance issues and secretion management issues. Patient has been communicative to staff and her family throughout the day today stating she does not want to be maintained on machines and is ready to die.     PERTINENT PMH REVIEWED: Yes     PAST MEDICAL & SURGICAL HISTORY:  Hemorrhage in the brain    No significant past surgical history    SOCIAL HISTORY:  unable to obtain patient at this time as patient is intubated                                   Admitted from:  [facility]    Surrogate -  and children     FAMILY HISTORY: unable to obtain at this time since patient is intubated.     Baseline ADLs (prior to admission):  Independent    Allergies    azithromycin (Unknown)  clarithromycin (Unknown)  erythromycin (Unknown)  fluoroquinolone antibiotics (Unknown)    Intolerances    Present Symptoms:     Dyspnea: 0 on ventilator   Nausea/Vomiting: No  Anxiety:  No  Depression: No  Fatigue: No  Loss of appetite: No    Pain: none currently             Character-            Duration-            Effect-            Factors-            Frequency-            Location-            Severity-    Review of Systems: Reviewed  Unable to obtain due to poor mentation   All others negative    MEDICATIONS  (STANDING):  ascorbic acid 500 milliGRAM(s) Oral daily  aspirin  chewable 81 milliGRAM(s) Oral daily  atorvastatin 40 milliGRAM(s) Oral at bedtime  chlorhexidine 0.12% Liquid 15 milliLiter(s) Oral Mucosa every 12 hours  dextrose 40% Gel 15 Gram(s) Oral once  dextrose 5%. 1000 milliLiter(s) (50 mL/Hr) IV Continuous <Continuous>  dextrose 5%. 1000 milliLiter(s) (100 mL/Hr) IV Continuous <Continuous>  dextrose 50% Injectable 25 Gram(s) IV Push once  dextrose 50% Injectable 12.5 Gram(s) IV Push once  dextrose 50% Injectable 25 Gram(s) IV Push once  enoxaparin Injectable 40 milliGRAM(s) SubCutaneous daily  glucagon  Injectable 1 milliGRAM(s) IntraMuscular once  insulin lispro (ADMELOG) corrective regimen sliding scale   SubCutaneous every 6 hours  lactated ringers. 1000 milliLiter(s) (60 mL/Hr) IV Continuous <Continuous>  midazolam Injectable 1 milliGRAM(s) IV Push once  multivitamin/minerals/iron Oral Solution (CENTRUM) 15 milliLiter(s) Oral daily  pantoprazole  Injectable 40 milliGRAM(s) IV Push daily  polyethylene glycol 3350 17 Gram(s) Oral daily  senna 2 Tablet(s) Oral at bedtime  sotalol 80 milliGRAM(s) Oral every 12 hours    MEDICATIONS  (PRN):  acetaminophen    Suspension .. 650 milliGRAM(s) Oral every 6 hours PRN Temp greater or equal to 38C (100.4F), Mild Pain (1 - 3)  fentaNYL    Injectable 12.5 MICROGram(s) IV Push every 4 hours PRN Moderate Pain (4 - 6)    PHYSICAL EXAM:    Vital Signs Last 24 Hrs  T(C): 36.4 (01 Apr 2021 07:16), Max: 38 (31 Mar 2021 16:00)  T(F): 97.5 (01 Apr 2021 07:16), Max: 100.4 (31 Mar 2021 16:00)  HR: 98 (01 Apr 2021 10:00) (69 - 117)  BP: 136/80 (01 Apr 2021 09:00) (68/18 - 136/80)  BP(mean): 98 (01 Apr 2021 09:00) (31 - 104)  RR: 20 (01 Apr 2021 10:00) (14 - 23)  SpO2: 98% (01 Apr 2021 10:00) (86% - 100%)    General: alert  intubated     Karnofsky:  30 %    HEENT: ET tube    Lungs: comfortable     CV: normal      GI: normal    : guevara     MSK: bedbound/wheelchair bound    Skin: no rash    LABS:                        8.0    14.81 )-----------( 143      ( 01 Apr 2021 05:50 )             24.8     04-01    142  |  110<H>  |  25.0<H>  ----------------------------<  150<H>  3.7   |  22.0  |  0.67    Ca    7.3<L>      01 Apr 2021 04:23  Phos  1.5     04-01  Mg     2.1     04-01      PT/INR - ( 31 Mar 2021 17:03 )   PT: 13.2 sec;   INR: 1.15 ratio         PTT - ( 31 Mar 2021 17:03 )  PTT:21.6 sec    I&O's Summary    31 Mar 2021 07:01 - 01 Apr 2021 07:00  --------------------------------------------------------  IN: 2704.3 mL / OUT: 2780 mL / NET: -75.7 mL    01 Apr 2021 07:01  -  01 Apr 2021 10:20  --------------------------------------------------------  IN: 280 mL / OUT: 300 mL / NET: -20 mL    RADIOLOGY & ADDITIONAL STUDIES: reviewed.     < from: MR Head No Cont (04.01.21 @ 11:39) >  IMPRESSION: Acute right MCA territory infarct.    < end of copied text >      ADVANCE DIRECTIVES: full code

## 2021-04-01 NOTE — CONSULT NOTE ADULT - ASSESSMENT
82F with    #1  #2  #3  #4 Encounter for palliative care - family has opted to pursue palliative extubation and comfort measures only per patient's wishes. For compassionate extubation would recommend the following for symptomatic management:  - Morphine continuous infusion at 2-4mg/hour with morphine 2-4 mg IVP Q 2 hours PRN dyspnea and/or RR > 16. If requiring 3 or more PRN dosing, double the continuous rate of infusion.  -ativan 2mg IVP x 1 15 minutes prior to extubation  - Robinul 0.4mg IVP x 1 15 minutes prior to extubation and then 0.4mg IVP Q 6 hours PRN excessive secretions.   - oral hygiene Q 4 hours ATC 82F with    #1  #2  #3  #4 Encounter for palliative care - family has opted to pursue palliative extubation and comfort measures only per patient's wishes. For compassionate extubation would recommend the following for symptomatic management:  - Morphine continuous infusion at 2-4mg/hour with morphine 2-4 mg IVP Q 2 hours PRN dyspnea and/or RR > 16. If requiring 3 or more PRN dosing, double the continuous rate of infusion.  -ativan 2mg IVP x 1 15 minutes prior to extubation. then ativan 1mg IVP Q 2 hours PRN agitation or restlessness.   - Robinul 0.4mg IVP x 1 15 minutes prior to extubation and then 0.4mg IVP Q 6 hours PRN excessive secretions.   - oral hygiene Q 4 hours ATC  - discontinue tube feeds.  82F with stroke s/p neuro intervention, with ventilator dependent respiratory failure, airway/secretion management concerns, debility.     #1 Stroke - s/p intervention. MRI today with acute right MCA territory infarct,  #2 Ventilator depedent - patient and family no longer want to pursue mechnical ventilator or trach. compassionate extubation when all are ready.   #3 Abnormal sputum - will likely need ATC anticholinergic   #4 Encounter for palliative care - family has opted to pursue palliative extubation and comfort measures only per patient's wishes. For compassionate extubation would recommend the following for symptomatic management:  - Morphine continuous infusion at 2-4mg/hour with morphine 2-4 mg IVP Q 2 hours PRN dyspnea and/or RR > 16. If requiring 3 or more PRN dosing, double the continuous rate of infusion.  -ativan 2mg IVP x 1 15 minutes prior to extubation. then ativan 1mg IVP Q 2 hours PRN agitation or restlessness.   - Robinul 0.4mg IVP x 1 15 minutes prior to extubation and then 0.4mg IVP Q 6 hours PRN excessive secretions.   - oral hygiene Q 4 hours ATC  - discontinue tube feeds.

## 2021-04-01 NOTE — PROGRESS NOTE ADULT - ASSESSMENT
ASSESSMENT/PLAN: S/P R M1 occlusion ; S/P alteplase given 2219 ; TICI- 3 thrombectomy 3/30    NEURO:  Neuro q 2hr  ASA, statin  Stroke core measures  MRI of brain  Activity:  [X] PT [X] OT / Splinting L leg     PULM: extubated and reintubated due to poor secretion mgt, hypoxia  Weaning parameters   SpO2>92%    CV:  hx of afib---> sinus   SBP goal- 110-140  QTC - 467 (sotalol home)  ECHO: EF 55-60%    RENAL: Cr stable   Na goal 135- 145   Fluids: NS at 60 cc/hr     GI: TF via NGT  Diet:  GI prophylaxis [X] not indicated   Bowel regimen  [X] senna [    ENDO:   Monitor FS 48 hrs- low SSI q 6   Goal euglycemia (-180)    HEME/ONC:  VTE prophylaxis: [X] SCDs [] chemoprophylaxis-    LD dopplers    ID:  Afeb  no leukocytosis    SOCIAL/FAMILY: [x] family meeting    CODE STATUS:  [x] Full Code     DISPOSITION:  [X] ICU     []x Patient is at high risk of neurologic deterioration/death due to: brain swelling and edema    Time spent:  40critical care minutes ASSESSMENT/PLAN: S/P R M1 occlusion ; S/P alteplase given 2219 ; TICI- 3 thrombectomy 3/30    NEURO:  Neuro q 2hr  ASA, statin  Stroke core measures  MRI of brain  Activity:  [X] PT [X] OT / Splinting L leg     PULM: extubated and reintubated due to poor secretion mgt, hypoxia  Weaning parameters   SpO2>92%    CV:  hx of afib   SBP goal- 110-140  QTC - 467 (sotalol home)  ECHO: EF 55-60%    RENAL: Cr stable   Na goal 135- 145   Fluids: NS at 60 cc/hr   DC guevara    GI: TF via NGT  Diet:  GI prophylaxis [X] not indicated   Bowel regimen  [X] senna    ENDO:   Monitor FS 48 hrs- low SSI q 6   Goal euglycemia (-180)    HEME/ONC:  VTE prophylaxis: [X] SCDs [] chemoprophylaxis- lovenox    LD dopplers    ID:  Afeb  no leukocytosis    SOCIAL/FAMILY: [x] family meeting today    CODE STATUS:  [x] Full Code     DISPOSITION:  [X] ICU     []x Patient is at high risk of neurologic deterioration/death due to: brain swelling and edema    Time spent:  40critical care minutes

## 2021-04-01 NOTE — GOALS OF CARE CONVERSATION - ADVANCED CARE PLANNING - CONVERSATION DETAILS
Patient has written multiple times while intubated that she does not wish to continue intubation / ventilation status. She wants to come off the ventilator. She expressed understanding without the ventilator she would likely die, she understands and is accepting of the outcome with her goals of care being comfort at this time. Her family including her , 2 daughters, and granddaughter were presents at bedside and now aware of patients wishes. Family in agreement to respect patient wishes and withdraw care and pursue only comfort measures at this time. Pt extubated and placed on comfort measures.

## 2021-04-01 NOTE — PROGRESS NOTE ADULT - SUBJECTIVE AND OBJECTIVE BOX
Chief complaint:   Patient is a 82y old  Female who presents with a chief complaint of Stroke, Code BIPLANE (31 Mar 2021 14:38)    HPI:  [id number] who presents as CODE BIPLANE from [facility] [place].  The patient was last seen well per EMS report by  at 8pm when she sustained a fall and was found to have a left facial droop and was unresponsive.  The patient brought to [facility] [place] where code stroke was activated (NIH on presentation 21 a CTA revealed right ICA and MCA (m2 per verbal report) occlusion and received IV TPA prior to activation of [facility] for transfer.  TPA bolus at 2219 per EMS.  TPA infusion started at 2224, and completed prior to arrival of patient to [facility].  Upon arrival taken to [facility] per Dr. Verde request and then taken to angio suite as soon as possible.  There were multiple attempts to call the  Efe Ortega [phone], but he did not answer the phone; he was driving from [facility] to [facility] as the patient was being transported by helicopter; therefore the procedure was performed emergently.    mRS baseline unknown at this time.  NIH at [facility] 21, NIH performed at [facility] 14 (see below) (30 Mar 2021 00:39)        24hr EVENTS:      ROS: [ ]  Unable to assess due to mental status   All other systems negative    -----------------------------------------------------------------------------------------------------------------------------------------------------------------------------------  ICU Vital Signs Last 24 Hrs  T(C): 36.4 (01 Apr 2021 07:16), Max: 38 (31 Mar 2021 16:00)  T(F): 97.5 (01 Apr 2021 07:16), Max: 100.4 (31 Mar 2021 16:00)  HR: 91 (01 Apr 2021 08:00) (68 - 117)  BP: 126/93 (01 Apr 2021 08:00) (68/18 - 131/94)  BP(mean): 103 (01 Apr 2021 08:00) (31 - 104)  ABP: 142/76 (01 Apr 2021 08:00) (88/67 - 148/85)  ABP(mean): 102 (01 Apr 2021 08:00) (77 - 108)  RR: 23 (01 Apr 2021 08:00) (14 - 23)  SpO2: 96% (01 Apr 2021 08:00) (86% - 100%)      I&O's Summary    31 Mar 2021 07:01  -  01 Apr 2021 07:00  --------------------------------------------------------  IN: 2704.3 mL / OUT: 2780 mL / NET: -75.7 mL        MEDICATIONS  (STANDING):  ascorbic acid 500 milliGRAM(s) Oral daily  aspirin  chewable 81 milliGRAM(s) Oral daily  atorvastatin 40 milliGRAM(s) Oral at bedtime  chlorhexidine 0.12% Liquid 15 milliLiter(s) Oral Mucosa every 12 hours  dextrose 40% Gel 15 Gram(s) Oral once  dextrose 5%. 1000 milliLiter(s) (50 mL/Hr) IV Continuous <Continuous>  dextrose 5%. 1000 milliLiter(s) (100 mL/Hr) IV Continuous <Continuous>  dextrose 50% Injectable 25 Gram(s) IV Push once  dextrose 50% Injectable 12.5 Gram(s) IV Push once  dextrose 50% Injectable 25 Gram(s) IV Push once  enoxaparin Injectable 40 milliGRAM(s) SubCutaneous daily  glucagon  Injectable 1 milliGRAM(s) IntraMuscular once  insulin lispro (ADMELOG) corrective regimen sliding scale   SubCutaneous every 6 hours  lactated ringers. 1000 milliLiter(s) (60 mL/Hr) IV Continuous <Continuous>  multivitamin/minerals/iron Oral Solution (CENTRUM) 15 milliLiter(s) Oral daily  pantoprazole  Injectable 40 milliGRAM(s) IV Push daily  polyethylene glycol 3350 17 Gram(s) Oral daily  senna 2 Tablet(s) Oral at bedtime  sotalol 80 milliGRAM(s) Oral every 12 hours      RESPIRATORY:  Mode: CPAP with PS  FiO2: 40  PEEP: 5  PS: 10  MAP: 9        IMAGING:   Recent imaging studies were reviewed.    LAB RESULTS:                          8.0    14.81 )-----------( 143      ( 01 Apr 2021 05:50 )             24.8       PT/INR - ( 31 Mar 2021 17:03 )   PT: 13.2 sec;   INR: 1.15 ratio         PTT - ( 31 Mar 2021 17:03 )  PTT:21.6 sec    04-01    142  |  110<H>  |  25.0<H>  ----------------------------<  150<H>  3.7   |  22.0  |  0.67    Ca    7.3<L>      01 Apr 2021 04:23  Phos  1.5     04-01  Mg     2.1     04-01            ABG - ( 31 Mar 2021 16:29 )  pH, Arterial: 7.50  pH, Blood: x     /  pCO2: 34    /  pO2: 104   / HCO3: 27    / Base Excess: 3.1   /  SaO2: 99                    -----------------------------------------------------------------------------------------------------------------------------------------------------------------------------------    PHYSICAL EXAM:  General: Calm, intubated  HEENT: MMM  Neuro:  -Mental status- No acute distress  -CN- PERRL 3mm, EOMI, tongue midline, face symmetric    CV: RRR  Pulm: clear to auscultation  Abd: Soft, nontender, nondistended  Ext: no noted edema in lower ext  Skin: warm, dry       Chief complaint:   Patient is a 82y old  Female who presents with a chief complaint of Stroke, Code BIPLANE (31 Mar 2021 14:38)    HPI:  80F who presents as CODE BIPLANE from [facility] [place].  The patient was last seen well per EMS report by  at 8pm when she sustained a fall and was found to have a left facial droop and was unresponsive.  The patient brought to [facility] where code stroke was activated (NIH on presentation 21 a CTA revealed right ICA and MCA (m2 per verbal report) occlusion and received IV TPA prior to activation of [facility] for transfer.  TPA bolus at 2219 per EMS.  TPA infusion started at 2224, and completed prior to arrival of patient to [facility].  Upon arrival taken to [facility] per Dr. Verde request and then taken to angio suite as soon as possible.  There were multiple attempts to call the  Efe Ortega [phone], but he did not answer the phone; he was driving from [facility] to [facility] as the patient was being transported by helicopter; therefore the procedure was performed emergently.    mRS baseline unknown at this time.  NIH at [facility] 21, NIH performed at [facility] 14 (see below) (30 Mar 2021 00:39)    24hr EVENTS:  extubated and reintubated  anxious overnight  hypotensive with precedex    ROS: wants ETT out  All other systems negative    -----------------------------------------------------------------------------------------------------------------------------------------------------------------------------------  ICU Vital Signs Last 24 Hrs  T(C): 36.4 (01 Apr 2021 07:16), Max: 38 (31 Mar 2021 16:00)  T(F): 97.5 (01 Apr 2021 07:16), Max: 100.4 (31 Mar 2021 16:00)  HR: 91 (01 Apr 2021 08:00) (68 - 117)  BP: 126/93 (01 Apr 2021 08:00) (68/18 - 131/94)  BP(mean): 103 (01 Apr 2021 08:00) (31 - 104)  ABP: 142/76 (01 Apr 2021 08:00) (88/67 - 148/85)  ABP(mean): 102 (01 Apr 2021 08:00) (77 - 108)  RR: 23 (01 Apr 2021 08:00) (14 - 23)  SpO2: 96% (01 Apr 2021 08:00) (86% - 100%)      I&O's Summary    31 Mar 2021 07:01  -  01 Apr 2021 07:00  --------------------------------------------------------  IN: 2704.3 mL / OUT: 2780 mL / NET: -75.7 mL        MEDICATIONS  (STANDING):  ascorbic acid 500 milliGRAM(s) Oral daily  aspirin  chewable 81 milliGRAM(s) Oral daily  atorvastatin 40 milliGRAM(s) Oral at bedtime  chlorhexidine 0.12% Liquid 15 milliLiter(s) Oral Mucosa every 12 hours  dextrose 40% Gel 15 Gram(s) Oral once  dextrose 5%. 1000 milliLiter(s) (50 mL/Hr) IV Continuous <Continuous>  dextrose 5%. 1000 milliLiter(s) (100 mL/Hr) IV Continuous <Continuous>  dextrose 50% Injectable 25 Gram(s) IV Push once  dextrose 50% Injectable 12.5 Gram(s) IV Push once  dextrose 50% Injectable 25 Gram(s) IV Push once  enoxaparin Injectable 40 milliGRAM(s) SubCutaneous daily  glucagon  Injectable 1 milliGRAM(s) IntraMuscular once  insulin lispro (ADMELOG) corrective regimen sliding scale   SubCutaneous every 6 hours  lactated ringers. 1000 milliLiter(s) (60 mL/Hr) IV Continuous <Continuous>  multivitamin/minerals/iron Oral Solution (CENTRUM) 15 milliLiter(s) Oral daily  pantoprazole  Injectable 40 milliGRAM(s) IV Push daily  polyethylene glycol 3350 17 Gram(s) Oral daily  senna 2 Tablet(s) Oral at bedtime  sotalol 80 milliGRAM(s) Oral every 12 hours      RESPIRATORY:  Mode: CPAP with PS  FiO2: 40  PEEP: 5  PS: 10  MAP: 9        IMAGING:   Recent imaging studies were reviewed.    LAB RESULTS:                          8.0    14.81 )-----------( 143      ( 01 Apr 2021 05:50 )             24.8       PT/INR - ( 31 Mar 2021 17:03 )   PT: 13.2 sec;   INR: 1.15 ratio         PTT - ( 31 Mar 2021 17:03 )  PTT:21.6 sec    04-01    142  |  110<H>  |  25.0<H>  ----------------------------<  150<H>  3.7   |  22.0  |  0.67    Ca    7.3<L>      01 Apr 2021 04:23  Phos  1.5     04-01  Mg     2.1     04-01      ABG - ( 31 Mar 2021 16:29 )  pH, Arterial: 7.50  pH, Blood: x     /  pCO2: 34    /  pO2: 104   / HCO3: 27    / Base Excess: 3.1   /  SaO2: 99          -----------------------------------------------------------------------------------------------------------------------------------------------------------------------------------    PHYSICAL EXAM:  General: Calm, intubated  HEENT: MMM  Neuro:  -Mental status- No acute distress, EO , writing messages, following commands, AOx3  -CN- PERRL 3mm, L facial droop  R gaze pref, L neglect  RUE/RLE full strength  LUE loc 3/5  LLE WD    CV: RRR  Pulm: clear to auscultation  Abd: Soft, nontender, nondistended  Ext: no noted edema in lower ext  Skin: warm, dry

## 2021-04-02 NOTE — DISCHARGE NOTE FOR THE EXPIRED PATIENT - HOSPITAL COURSE
80F presented as CODE BIPLANE from [facility] late in evening of 3/29/2021. The patient was last seen well per EMS report by  at 8pm when she sustained a fall and was found to have a left facial droop and was unresponsive.  The patient brought to [facility] where code stroke was activated (NIH on presentation 21) a CTA revealed right ICA and MCA (m2 per verbal report) occlusion and received IV TPA prior to activation of [facility] for transfer.  TPA bolus at 2219 per EMS.  TPA infusion started at 2224, and completed prior to arrival of patient to [facility].  Upon arrival taken to [facility] per Dr. Verde request and then taken to angio suite as soon as possible. Emergent thrombectomy was performed early am 3/30. Patient was admitted to N[facility]U. Patient met extubation criteria on 3/31, was extubated, however had difficulty protecting her airway and clearing secretions secondary to pharyngeal muscle weakness. Patient was re intubated the same day per discussion with the  who at the time reported the patient's wishes were to be kept alive even if it meant living on breathing machine.   The patient remained alert and continually expressed her wishes by writing them down. She expressed that she wanted to be extubated. She expressed understanding that she likely would not succeed in breathing on her own and this would likely lead to her expiration. An MRI was obtained which showed a large right MCA stroke. Palliative service was consulted. The family came to visit on  and once they realized that the patient's wishes to be kept comfortable and remove the tube were very clear, the family agreed with shift of focus to Comfort Measures and extubation. Patient was extubated on 21 at 16:00. Family remained at the bedside. Patient was placed on medications for comfort and  on 21 at 11:07 with all of her family surrounding her.

## 2021-04-02 NOTE — PROGRESS NOTE ADULT - SUBJECTIVE AND OBJECTIVE BOX
Chief complaint:   Patient is a 80y old  Female who presents with a chief complaint of Stroke, Code BIPLANE (02 Apr 2021 08:15)    HPI:  80F who [place] [facility]  The patient was last seen well per EMS report by  at 8pm when she sustained a fall and was found to have a left facial droop and was unresponsive.  The patient [place] [facility] [place] code stroke was activated (NIH on presentation 21 a CTA revealed right ICA and MCA (m2 per verbal report) occlusion and received IV TPA prior to activation of [facility] for transfer.  TPA bolus at 2219 per EMS.  TPA infusion started at 2224, and completed prior to arrival of patient to [facility].  Upon arrival taken to [facility] per Dr. Verde request and then taken to angio suite as soon as possible.  There were multiple attempts to call the  Efe Ortega [phone], but he did not answer the phone; he [place] [facility] [place] [facility] as the patient was being transported by helicopter; therefore the procedure was performed emergently.    mRS baseline unknown at this time.  [place]H [place] [facility] 21, NIH performed at [facility] 14 (see below) (30 Mar 2021 00:39)        24hr EVENTS: Comfort measures only  compassionate extubation      ROS: [ ]  Unable to assess due to mental status   All other systems negative    -----------------------------------------------------------------------------------------------------------------------------------------------------------------------------------  ICU Vital Signs Last 24 Hrs  T(C): 38.1 (01 Apr 2021 12:01), Max: 38.1 (01 Apr 2021 12:01)  T(F): 100.6 (01 Apr 2021 12:01), Max: 100.6 (01 Apr 2021 12:01)  HR: 96 (01 Apr 2021 19:00) (80 - 121)  BP: 135/77 (01 Apr 2021 15:00) (120/86 - 151/138)  BP(mean): 95 (01 Apr 2021 15:00) (95 - 145)  ABP: 153/86 (01 Apr 2021 19:00) (142/78 - 161/98)  ABP(mean): 113 (01 Apr 2021 19:00) (104 - 124)  RR: 21 (01 Apr 2021 19:00) (17 - 38)  SpO2: 95% (01 Apr 2021 19:00) (95% - 100%)      I&O's Summary    01 Apr 2021 07:01  -  02 Apr 2021 07:00  --------------------------------------------------------  IN: 1405.8 mL / OUT: 1660 mL / NET: -254.2 mL        MEDICATIONS  (STANDING):  glycopyrrolate Injectable 0.4 milliGRAM(s) IV Push every 6 hours  morphine  Infusion. 2 mG/Hr (2 mL/Hr) IV Continuous <Continuous>

## 2021-04-02 NOTE — PROGRESS NOTE ADULT - REASON FOR ADMISSION
Stroke, Code BIPLANE

## 2021-04-02 NOTE — CHART NOTE - NSCHARTNOTEFT_GEN_A_CORE
Pt assessed by me for unresponsiveness. On exam, the patient did not respond to verbal or physical stimuli. Absent heart sounds. No spontaneous respiration. Absent peripheral pulses. Pupils are fixed and dilated bilaterally. Pt pronounced dead at 11:07 on April 2, 2021. Dr. Grimes notified. Family at the bedside,  Efe Canchola and daughter Ellen Puente as well as other family members at the time of expiration. RN to contact [employer].      ARCHIE Rojo PA-C

## 2021-04-02 NOTE — PROGRESS NOTE ADULT - ASSESSMENT
80F with stroke s/p neuro intervention, with ventilator dependent respiratory failure, airway/secretion management concerns, debility.     #1 Stroke - s/p intervention. MRI today with acute right MCA territory infarct. poor prognosis  #2 Dyspnea -increase morphine infusion for worsening dyspnea. PRN morphine as well.   #3 Abnormal sputum - will likely need ATC anticholinergic   #4 Encounter for palliative care - met with Catarino spouse at bedside and daughter Peggy. Explained overall plan of care of comfort. Family was surprised she didn't die right away, I explained to them that we never know how long things will be but we can ensure that she is as comfortable as possible  80F with stroke s/p neuro intervention, with ventilator dependent respiratory failure, airway/secretion management concerns, debility.     #1 Stroke - s/p intervention. MRI today with acute right MCA territory infarct. poor prognosis  #2 Dyspnea -increase morphine infusion for worsening dyspnea. PRN morphine as well.   #3 Abnormal sputum - will likely need ATC anticholinergic   #4 Encounter for palliative care - met with Catarino spouse at bedside and daughter Peggy. Explained overall plan of care of comfort. Family was surprised she didn't die right away, I explained to them that we never know how long things will be but we can ensure that she is as comfortable as possible. I discussed possible inpatient hospice transfer, family will discuss and let us know[place] [facility].

## 2021-04-02 NOTE — PROGRESS NOTE ADULT - ASSESSMENT
80yoF presented with R M1 occlusion ; S/P alteplase given 2219 ; TICI- 3 thrombectomy 3/30. Pt had a trial of extubation and was reintubated. After thorough discussion with the family and patient, they decided to pursue comfort measures only. She was compassionately extubated yesterday. Palliative care measures instated. Appreciate palliative recs.

## 2021-04-02 NOTE — PROGRESS NOTE ADULT - SUBJECTIVE AND OBJECTIVE BOX
OVERNIGHT EVENTS: s/p palliative extubation. on comfort measures. on morphine infusion at 1mg/ hour but in respiratory distress.     Present Symptoms:     Dyspnea: 0  Nausea/Vomiting: No  Anxiety:  No  Depression: unable   Fatigue: unable   Loss of appetite: No    Pain: none             Character-            Duration-            Effect-            Factors-            Frequency-            Location-            Severity-    Review of Systems: Reviewed                 Unable to obtain due to poor mentation   All others negative    MEDICATIONS  (STANDING):  glycopyrrolate Injectable 0.4 milliGRAM(s) IV Push every 6 hours  morphine  Infusion. 2 mG/Hr (2 mL/Hr) IV Continuous <Continuous>    MEDICATIONS  (PRN):  LORazepam   Injectable 1 milliGRAM(s) IV Push every 2 hours PRN Agitation  morphine  - Injectable 2 milliGRAM(s) IV Push every 1 hour PRN Mild Pain (1 - 3)    PHYSICAL EXAM:    Vital Signs Last 24 Hrs  T(C): 38.1 (01 Apr 2021 12:01), Max: 38.1 (01 Apr 2021 12:01)  T(F): 100.6 (01 Apr 2021 12:01), Max: 100.6 (01 Apr 2021 12:01)  HR: 96 (01 Apr 2021 19:00) (80 - 121)  BP: 135/77 (01 Apr 2021 15:00) (120/86 - 151/138)  BP(mean): 95 (01 Apr 2021 15:00) (95 - 145)  RR: 21 (01 Apr 2021 19:00) (17 - 38)  SpO2: 95% (01 Apr 2021 19:00) (95% - 100%)    General: in some respiratory distress     Karnofsky: 20 %    HEENT: normal      Lungs: tachypnea/labored breathing      CV: normal      GI: normal     : guevara    MSK: bedbound/wheelchair bound    Skin: no rash    LABS:                      8.0    14.81 )-----------( 143      ( 01 Apr 2021 05:50 )             24.8     04-01    142  |  110<H>  |  25.0<H>  ----------------------------<  150<H>  3.7   |  22.0  |  0.67    Ca    7.3<L>      01 Apr 2021 04:23  Phos  1.5     04-01  Mg     2.1     04-01    PT/INR - ( 31 Mar 2021 17:03 )   PT: 13.2 sec;   INR: 1.15 ratio       PTT - ( 31 Mar 2021 17:03 )  PTT:21.6 sec    I&O's Summary    01 Apr 2021 07:01  -  02 Apr 2021 07:00  --------------------------------------------------------  IN: 1405.8 mL / OUT: 1660 mL / NET: -254.2 mL    RADIOLOGY & ADDITIONAL STUDIES:    ADVANCE DIRECTIVES: DNR/I

## 2021-04-05 LAB
CELIAC DISEASE INTERPRETATION: SIGNIFICANT CHANGE UP
CELIAC GENE PAIRS PRESENT: YES — SIGNIFICANT CHANGE UP
DQ ALPHA 1: SIGNIFICANT CHANGE UP
DQ BETA 1: SIGNIFICANT CHANGE UP
IMMUNOGLOBULIN A (IGA), S: 124 MG/DL — SIGNIFICANT CHANGE UP (ref 61–356)

## 2021-04-13 NOTE — CHART NOTE - NSCHARTNOTEFT_GEN_A_CORE
Palliative care social work note.    Bereavements call made ot patients spouse Efe. Support and resources offered.

## 2021-04-13 NOTE — CHART NOTE - NSCHARTNOTESELECT_GEN_ALL_CORE
Family Update/Event Note
Event Note
Expiration/Event Note
Family Update/Event Note
Neurointerventional Post Procedure Note/Event Note
Neurointerventional Preprocedure Note/Event Note
